# Patient Record
Sex: MALE | Race: WHITE | NOT HISPANIC OR LATINO | Employment: FULL TIME | URBAN - METROPOLITAN AREA
[De-identification: names, ages, dates, MRNs, and addresses within clinical notes are randomized per-mention and may not be internally consistent; named-entity substitution may affect disease eponyms.]

---

## 2022-11-04 ENCOUNTER — HOSPITAL ENCOUNTER (EMERGENCY)
Facility: HOSPITAL | Age: 42
Discharge: HOME | End: 2022-11-04
Attending: EMERGENCY MEDICINE

## 2022-11-04 ENCOUNTER — APPOINTMENT (EMERGENCY)
Dept: RADIOLOGY | Facility: HOSPITAL | Age: 42
End: 2022-11-04

## 2022-11-04 VITALS
OXYGEN SATURATION: 99 % | RESPIRATION RATE: 15 BRPM | TEMPERATURE: 98.5 F | WEIGHT: 299.16 LBS | HEART RATE: 112 BPM | SYSTOLIC BLOOD PRESSURE: 115 MMHG | DIASTOLIC BLOOD PRESSURE: 68 MMHG

## 2022-11-04 DIAGNOSIS — R00.0 SINUS TACHYCARDIA: ICD-10-CM

## 2022-11-04 DIAGNOSIS — M54.50 ACUTE MIDLINE LOW BACK PAIN WITHOUT SCIATICA: Primary | ICD-10-CM

## 2022-11-04 LAB
ALBUMIN SERPL BCP-MCNC: 3.9 G/DL (ref 3.5–5)
ALP SERPL-CCNC: 64 U/L (ref 34–104)
ALT SERPL W P-5'-P-CCNC: 39 U/L (ref 7–52)
ANION GAP SERPL CALCULATED.3IONS-SCNC: 8 MMOL/L (ref 4–13)
AST SERPL W P-5'-P-CCNC: 26 U/L (ref 13–39)
BASOPHILS # BLD AUTO: 0.06 THOUSANDS/ÂΜL (ref 0–0.1)
BASOPHILS NFR BLD AUTO: 1 % (ref 0–1)
BILIRUB SERPL-MCNC: 0.81 MG/DL (ref 0.2–1)
BNP SERPL-MCNC: 349 PG/ML (ref 0–100)
BUN SERPL-MCNC: 16 MG/DL (ref 5–25)
CALCIUM SERPL-MCNC: 9 MG/DL (ref 8.4–10.2)
CARDIAC TROPONIN I PNL SERPL HS: 37 NG/L (ref 8–18)
CARDIAC TROPONIN I PNL SERPL HS: 42 NG/L (ref 8–18)
CHLORIDE SERPL-SCNC: 106 MMOL/L (ref 96–108)
CO2 SERPL-SCNC: 25 MMOL/L (ref 21–32)
CREAT SERPL-MCNC: 1.2 MG/DL (ref 0.6–1.3)
EOSINOPHIL # BLD AUTO: 0.07 THOUSAND/ÂΜL (ref 0–0.61)
EOSINOPHIL NFR BLD AUTO: 1 % (ref 0–6)
ERYTHROCYTE [DISTWIDTH] IN BLOOD BY AUTOMATED COUNT: 14.1 % (ref 11.6–15.1)
GFR SERPL CREATININE-BSD FRML MDRD: 73 ML/MIN/1.73SQ M
GLUCOSE SERPL-MCNC: 103 MG/DL (ref 65–140)
HCT VFR BLD AUTO: 47.1 % (ref 36.5–49.3)
HGB BLD-MCNC: 15.4 G/DL (ref 12–17)
IMM GRANULOCYTES # BLD AUTO: 0.02 THOUSAND/UL (ref 0–0.2)
IMM GRANULOCYTES NFR BLD AUTO: 0 % (ref 0–2)
LYMPHOCYTES # BLD AUTO: 0.71 THOUSANDS/ÂΜL (ref 0.6–4.47)
LYMPHOCYTES NFR BLD AUTO: 11 % (ref 14–44)
MCH RBC QN AUTO: 27.7 PG (ref 26.8–34.3)
MCHC RBC AUTO-ENTMCNC: 32.7 G/DL (ref 31.4–37.4)
MCV RBC AUTO: 85 FL (ref 82–98)
MONOCYTES # BLD AUTO: 0.63 THOUSAND/ÂΜL (ref 0.17–1.22)
MONOCYTES NFR BLD AUTO: 10 % (ref 4–12)
NEUTROPHILS # BLD AUTO: 4.96 THOUSANDS/ÂΜL (ref 1.85–7.62)
NEUTS SEG NFR BLD AUTO: 77 % (ref 43–75)
NRBC BLD AUTO-RTO: 0 /100 WBCS
PLATELET # BLD AUTO: 234 THOUSANDS/UL (ref 149–390)
PMV BLD AUTO: 10.4 FL (ref 8.9–12.7)
POTASSIUM SERPL-SCNC: 4 MMOL/L (ref 3.5–5.3)
PROT SERPL-MCNC: 7.1 G/DL (ref 6.4–8.4)
RBC # BLD AUTO: 5.55 MILLION/UL (ref 3.88–5.62)
SODIUM SERPL-SCNC: 139 MMOL/L (ref 135–147)
TSH SERPL DL<=0.05 MIU/L-ACNC: 1.13 UIU/ML (ref 0.45–4.5)
WBC # BLD AUTO: 6.45 THOUSAND/UL (ref 4.31–10.16)

## 2022-11-04 RX ORDER — CYCLOBENZAPRINE HCL 10 MG
5 TABLET ORAL
Qty: 7 TABLET | Refills: 0 | Status: SHIPPED | OUTPATIENT
Start: 2022-11-04 | End: 2022-11-11

## 2022-11-04 RX ORDER — OXYCODONE HYDROCHLORIDE 5 MG/1
5 CAPSULE ORAL EVERY 6 HOURS PRN
Qty: 10 CAPSULE | Refills: 0 | Status: SHIPPED | OUTPATIENT
Start: 2022-11-04

## 2022-11-04 RX ORDER — OXYCODONE HYDROCHLORIDE 5 MG/1
5 TABLET ORAL ONCE
Status: COMPLETED | OUTPATIENT
Start: 2022-11-04 | End: 2022-11-04

## 2022-11-04 RX ORDER — CYCLOBENZAPRINE HCL 10 MG
5 TABLET ORAL ONCE
Status: COMPLETED | OUTPATIENT
Start: 2022-11-04 | End: 2022-11-04

## 2022-11-04 RX ADMIN — CYCLOBENZAPRINE HYDROCHLORIDE 5 MG: 10 TABLET, FILM COATED ORAL at 16:40

## 2022-11-04 RX ADMIN — OXYCODONE HYDROCHLORIDE 5 MG: 5 TABLET ORAL at 16:40

## 2022-11-04 NOTE — ED PROCEDURE NOTE
PROCEDURE  ECG 12 Lead Documentation Only    Date/Time: 11/4/2022 5:14 PM  Performed by: Maged Blanca MD  Authorized by: Maged Blanca MD     Indications / Diagnosis:  Tachy--   ECG reviewed by me, the ED Provider: yes    Patient location:  ED and bedside  Previous ECG:     Previous ECG:  Unavailable    Comparison to cardiac monitor: Yes    Interpretation:     Interpretation: non-specific    Rate:     ECG rate:  110    ECG rate assessment: tachycardic    Rhythm:     Rhythm: sinus tachycardia    Ectopy:     Ectopy: none    QRS:     QRS axis:  Normal    QRS intervals:   Wide  Conduction:     Conduction: abnormal      Abnormal conduction: non-specific intraventricular conduction delay    ST segments:     ST segments:  Normal  T waves:     T waves: flattening      Flattening:  I, aVL, II, III, aVF, V1 and V6  Q waves:     Q waves:  V1  Comments:      No ecg signs of ischemia/ injury / r heart strain / jazmin/pericarditis          Maged Blanca MD  11/04/22 0075

## 2022-11-04 NOTE — Clinical Note
Zachary Garcia was seen and treated in our emergency department on 11/4/2022  Diagnosis:     Nessa Roberson  may return to work on return date  He may return on this date: 11/09/2022         If you have any questions or concerns, please don't hesitate to call        Ishan Rodriguez MD    ______________________________           _______________          _______________  Hospital Representative                              Date                                Time

## 2022-11-06 LAB
ATRIAL RATE: 110 BPM
P AXIS: 78 DEGREES
PR INTERVAL: 166 MS
QRS AXIS: 71 DEGREES
QRSD INTERVAL: 130 MS
QT INTERVAL: 360 MS
QTC INTERVAL: 487 MS
T WAVE AXIS: 40 DEGREES
VENTRICULAR RATE: 110 BPM

## 2022-11-07 NOTE — ED PROVIDER NOTES
History  Chief Complaint   Patient presents with   • Back Pain     Pt presents to the ED with severe lower back pain onset around 1300  Pt reports getting dressed, putting on boxers when he felt sudden severe pain over his lower back, lowered himself to the ground  Unable to get up from the ground until EMS arrived  Reports hx of sciatica in 2012, otherwise denies reports of imaging or back hx  Denies dysuria  36 yr male wtih acute onset of mid low back pain after bending over to put leg in pants- non radiating-  Worse upon any movement-- no prior back problems-- pt also relates unrelated to back pain -  for approx several months of sob- mote at night and when awkening- feesl has sleep apnea- no cp at anytime- no aponte- no ble edema  Pnd  orthopnea      History provided by:  Patient and spouse   used: No        None       History reviewed  No pertinent past medical history  History reviewed  No pertinent surgical history  History reviewed  No pertinent family history  I have reviewed and agree with the history as documented  E-Cigarette/Vaping     E-Cigarette/Vaping Substances     Social History     Tobacco Use   • Smoking status: Former Smoker   • Smokeless tobacco: Never Used   Substance Use Topics   • Alcohol use: Not Currently   • Drug use: Never       Review of Systems   Constitutional: Negative  HENT: Negative  Eyes: Negative  Respiratory: Positive for shortness of breath  Negative for apnea, cough, choking, chest tightness, wheezing and stridor  Cardiovascular: Negative  Gastrointestinal: Negative  Endocrine: Negative  Genitourinary: Negative  Musculoskeletal: Positive for back pain  Negative for arthralgias, gait problem, joint swelling, myalgias, neck pain and neck stiffness  Skin: Negative  Allergic/Immunologic: Negative  Neurological: Negative  Hematological: Negative  Psychiatric/Behavioral: Negative          Physical Exam  Physical Exam  Vitals and nursing note reviewed  Constitutional:       General: He is in acute distress  Appearance: Normal appearance  He is not ill-appearing, toxic-appearing or diaphoretic  Comments: avss- mild tachcardia--  Pulse ox 99 % on ra- interpretation is normal- no intervention - in low back pain    HENT:      Head: Normocephalic and atraumatic  Nose: Nose normal  No congestion or rhinorrhea  Mouth/Throat:      Mouth: Mucous membranes are moist       Pharynx: Oropharynx is clear  No oropharyngeal exudate or posterior oropharyngeal erythema  Eyes:      General: No scleral icterus  Right eye: No discharge  Left eye: No discharge  Extraocular Movements: Extraocular movements intact  Conjunctiva/sclera: Conjunctivae normal       Pupils: Pupils are equal, round, and reactive to light  Comments: Mm pink   Neck:      Vascular: No carotid bruit  Comments: No pmt c spine   Cardiovascular:      Rate and Rhythm: Regular rhythm  Tachycardia present  Pulses: Normal pulses  Heart sounds: Normal heart sounds  No murmur heard  No friction rub  No gallop  Pulmonary:      Effort: Pulmonary effort is normal  No respiratory distress  Breath sounds: Normal breath sounds  No stridor  No wheezing, rhonchi or rales  Chest:      Chest wall: No tenderness  Abdominal:      General: Bowel sounds are normal  There is no distension  Palpations: Abdomen is soft  There is no mass  Tenderness: There is no abdominal tenderness  There is no right CVA tenderness, left CVA tenderness, guarding or rebound  Hernia: No hernia is present  Comments: Soft nt/nd- no hsm- no cva tenderness- no ascites- no peritoneal signs   Musculoskeletal:         General: Tenderness and signs of injury present  No swelling or deformity  Cervical back: Normal range of motion and neck supple  No rigidity or tenderness  Right lower leg: No edema        Left lower leg: No edema  Comments: Equal bilateral radial/dp pulses- no ble edema/calf tenderness/asym/ erythema- pos pmt lower l spine and medial bilateral paralumbar muscle tenderness to palpation and active rom    Lymphadenopathy:      Cervical: No cervical adenopathy  Skin:     General: Skin is warm  Capillary Refill: Capillary refill takes less than 2 seconds  Coloration: Skin is not jaundiced or pale  Findings: No bruising, erythema, lesion or rash  Neurological:      General: No focal deficit present  Mental Status: He is alert and oriented to person, place, and time  Mental status is at baseline  Cranial Nerves: No cranial nerve deficit  Sensory: No sensory deficit  Motor: No weakness  Coordination: Coordination normal       Gait: Gait normal       Deep Tendon Reflexes: Reflexes normal       Comments: Normal non focal neuro exam    Psychiatric:         Mood and Affect: Mood normal          Behavior: Behavior normal          Thought Content:  Thought content normal          Judgment: Judgment normal          Vital Signs  ED Triage Vitals [11/04/22 1602]   Temperature Pulse Respirations Blood Pressure SpO2   98 5 °F (36 9 °C) (!) 110 16 107/77 99 %      Temp Source Heart Rate Source Patient Position - Orthostatic VS BP Location FiO2 (%)   Oral Monitor Sitting Right arm --      Pain Score       10 - Worst Possible Pain           Vitals:    11/04/22 1602 11/04/22 1842 11/04/22 2030   BP: 107/77 115/68 115/68   Pulse: (!) 110 104 (!) 112   Patient Position - Orthostatic VS: Sitting Sitting          Visual Acuity      ED Medications  Medications   oxyCODONE (ROXICODONE) IR tablet 5 mg (5 mg Oral Given 11/4/22 1640)   cyclobenzaprine (FLEXERIL) tablet 5 mg (5 mg Oral Given 11/4/22 1640)       Diagnostic Studies  Results Reviewed     Procedure Component Value Units Date/Time    High Sensitivity Troponin I Random [[id number]]  (Abnormal) Collected: 11/04/22 2005    Lab Status: Final result Specimen: Blood from Arm, Right Updated: 11/04/22 2056     HS TnI random 37 ng/L     B-Type Natriuretic Peptide(BNP), AN, CA, EA Campuses Only [[id number]]  (Abnormal) Collected: 11/04/22 1907    Lab Status: Final result Specimen: Blood Updated: 11/04/22 1937      pg/mL     TSH [[id number]]  (Normal) Collected: 11/04/22 1840    Lab Status: Final result Specimen: Blood from Arm, Right Updated: 11/04/22 1928     TSH 3RD GENERATON 1 129 uIU/mL     Narrative:      Patients undergoing fluorescein dye angiography may retain small amounts of fluorescein in the body for 48-72 hours post procedure  Samples containing fluorescein can produce falsely depressed TSH values  If the patient had this procedure,a specimen should be resubmitted post fluorescein clearance        High Sensitivity Troponin I Random [[id number]]  (Abnormal) Collected: 11/04/22 1840    Lab Status: Final result Specimen: Blood from Arm, Right Updated: 11/04/22 1919     HS TnI random 42 ng/L     Comprehensive metabolic panel [[id number]] Collected: 11/04/22 1840    Lab Status: Final result Specimen: Blood from Arm, Right Updated: 11/04/22 1910     Sodium 139 mmol/L      Potassium 4 0 mmol/L      Chloride 106 mmol/L      CO2 25 mmol/L      ANION GAP 8 mmol/L      BUN 16 mg/dL      Creatinine 1 20 mg/dL      Glucose 103 mg/dL      Calcium 9 0 mg/dL      AST 26 U/L      ALT 39 U/L      Alkaline Phosphatase 64 U/L      Total Protein 7 1 g/dL      Albumin 3 9 g/dL      Total Bilirubin 0 81 mg/dL      eGFR 73 ml/min/1 73sq m     Narrative:      [facility] guidelines for Chronic Kidney Disease (CKD):   •  Stage 1 with normal or high GFR (GFR > 90 mL/min/1 73 square meters)  •  Stage 2 Mild CKD (GFR = 60-89 mL/min/1 73 square meters)  •  Stage 3A Moderate CKD (GFR = 45-59 mL/min/1 73 square meters)  •  Stage 3B Moderate CKD (GFR = 30-44 mL/min/1 73 square meters)  •  Stage 4 Severe CKD (GFR = 15-29 mL/min/1 73 square meters)  •  Stage 5 End Stage CKD (GFR <15 mL/min/1 73 square meters)  Note: GFR calculation is accurate only with a steady state creatinine    CBC and differential [[id number]]  (Abnormal) Collected: 11/04/22 1840    Lab Status: Final result Specimen: Blood from Arm, Right Updated: 11/04/22 1850     WBC 6 45 Thousand/uL      RBC 5 55 Million/uL      Hemoglobin 15 4 g/dL      Hematocrit 47 1 %      MCV 85 fL      MCH 27 7 pg      MCHC 32 7 g/dL      RDW 14 1 %      MPV 10 4 fL      Platelets 171 Thousands/uL      nRBC 0 /100 WBCs      Neutrophils Relative 77 %      Immat GRANS % 0 %      Lymphocytes Relative 11 %      Monocytes Relative 10 %      Eosinophils Relative 1 %      Basophils Relative 1 %      Neutrophils Absolute 4 96 Thousands/µL      Immature Grans Absolute 0 02 Thousand/uL      Lymphocytes Absolute 0 71 Thousands/µL      Monocytes Absolute 0 63 Thousand/µL      Eosinophils Absolute 0 07 Thousand/µL      Basophils Absolute 0 06 Thousands/µL                  XR chest 1 view portable   Final Result by Mica Freedman MD (11/05 0720)      No acute cardiopulmonary disease                    Workstation performed: [id number]                    Procedures  Procedures         ED Course  ED Course as of 11/07/22 1629   Fri Nov 04, 2022   1656 -- er md note-  6/22 outpt labs al l reviewed by er md   5 Er md note-   pt previous  b well [place]- which is a health center at his employer-= [facility] -- pulse has been persistently elevated  -- bp normal -= they do describe a 12 lead ecg which shows sinus tach with t wave flattening-- and rec that pt see cardiology-- pt has not seen cardiology-- pt describes sob-- but more of at night and in am - pt feels he has sleep apnea-- pt denies any progressive aponte/ cp at anytime-- no ble edema/ orthopnea/ pnd--  pt is agreeable to more of a er workup-- which is unrelated to reason that he is her for low back pain upon bending over to put his leg in his pants-    1748 Cxr portable-- borderline cardiomegaly -- normal mediastinum/no free/sq air- no infiltrate/ ptx/ pulm edema/ pleural effusions   2138 - er md note- pt re-evaluated- juan alberto to ambulate with er md prior to er d/c- with decreased pain                                              MDM    Disposition  Final diagnoses:   Acute midline low back pain without sciatica   Sinus tachycardia     Time reflects when diagnosis was documented in both MDM as applicable and the Disposition within this note     Time User Action Codes Description Comment    11/4/2022  9:45 PM Bonner Banister Add [M54 50] Acute midline low back pain without sciatica     11/4/2022  9:45 PM Bonner Banister Add [R00 0] Sinus tachycardia       ED Disposition     ED Disposition   Discharge    Condition   Stable    Date/Time   Fri Nov 4, 2022  9:45 PM    Comment   [place] discharge to home/self care                 Follow-up Information     Follow up With Specialties Details Why Contact Info Additional Information    Barb Martinez DO Family Medicine Call in 2 days  [address] [address]  [phone]       [address] [place] [address] Pain Medicine Call  [facility] [address] [place] [phone]  [facility], [address] [facility], [address] [place] [address], [facility]          Discharge Medication List as of 11/4/2022  9:58 PM      START taking these medications    Details   cyclobenzaprine (FLEXERIL) 10 mg tablet Take 0 5 tablets (5 mg total) by mouth daily at bedtime for 7 doses, Starting Fri 11/4/2022, Until Fri 11/11/2022, Normal      oxyCODONE (OXY-IR) 5 MG capsule Take 1 capsule (5 mg total) by mouth every 6 (six) hours as needed for severe pain for up to 10 doses CAN SUBSTITUTE OXYCODONE TABLETS FOR CAPSULES AS NEEDED FOR SEVERE PAIN Max Daily Amount: 20 mg, Starting Fri 11/4/2022, Normal             Outpatient Discharge Orders Ambulatory Referral to Pain Management   Standing Status: Future Standing Exp  Date: 12/04/22      Echo complete w/ contrast if indicated   Standing Status: Future Standing Exp   Date: 12/04/22       PDMP Review     None          ED Provider  Electronically Signed by           Kenny Dutton MD  11/07/22 9395

## 2022-11-17 ENCOUNTER — HOSPITAL ENCOUNTER (OUTPATIENT)
Dept: NON INVASIVE DIAGNOSTICS | Facility: HOSPITAL | Age: 42
Discharge: HOME | End: 2022-11-17
Attending: EMERGENCY MEDICINE

## 2022-11-17 VITALS
SYSTOLIC BLOOD PRESSURE: 144 MMHG | DIASTOLIC BLOOD PRESSURE: 109 MMHG | HEIGHT: 74 IN | BODY MASS INDEX: 38.37 KG/M2 | HEART RATE: 120 BPM | WEIGHT: 299 LBS

## 2022-11-17 DIAGNOSIS — R00.0 SINUS TACHYCARDIA: ICD-10-CM

## 2022-11-17 LAB
AORTIC ROOT: 3.5 CM
FRACTIONAL SHORTENING: 8 % (ref 28–44)
INTERVENTRICULAR SEPTUM IN DIASTOLE (PARASTERNAL SHORT AXIS VIEW): 1.2 CM
INTERVENTRICULAR SEPTUM: 1.2 CM (ref 0.6–1.1)
LAAS-AP4: 27.9 CM2
LEFT ATRIUM SIZE: 5.6 CM
LEFT INTERNAL DIMENSION IN SYSTOLE: 6.7 CM (ref 2.1–4)
LEFT VENTRICULAR INTERNAL DIMENSION IN DIASTOLE: 7.3 CM (ref 3.5–6)
LEFT VENTRICULAR POSTERIOR WALL IN END DIASTOLE: 1.2 CM
LEFT VENTRICULAR STROKE VOLUME: 55 ML
LVSV (TEICH): 55 ML
RIGHT ATRIAL 2D VOLUME: 91 ML
RIGHT ATRIUM AREA SYSTOLE A4C: 26.5 CM2
RIGHT VENTRICLE ID DIMENSION: 4.5 CM
SL CV LV EF: 20
SL CV PED ECHO LEFT VENTRICLE DIASTOLIC VOLUME (MOD BIPLANE) 2D: 283 ML
SL CV PED ECHO LEFT VENTRICLE SYSTOLIC VOLUME (MOD BIPLANE) 2D: 228 ML

## 2022-11-21 ENCOUNTER — OFFICE VISIT (OUTPATIENT)
Dept: CARDIOLOGY CLINIC | Facility: CLINIC | Age: 42
End: 2022-11-21

## 2022-11-21 ENCOUNTER — PREP FOR PROCEDURE (OUTPATIENT)
Dept: CARDIOLOGY CLINIC | Facility: CLINIC | Age: 42
End: 2022-11-21

## 2022-11-21 VITALS
HEART RATE: 113 BPM | SYSTOLIC BLOOD PRESSURE: 138 MMHG | DIASTOLIC BLOOD PRESSURE: 82 MMHG | WEIGHT: 284 LBS | OXYGEN SATURATION: 99 % | HEIGHT: 74 IN | BODY MASS INDEX: 36.45 KG/M2

## 2022-11-21 DIAGNOSIS — R29.818 SUSPECTED SLEEP APNEA: ICD-10-CM

## 2022-11-21 DIAGNOSIS — I50.20 HEART FAILURE WITH REDUCED EJECTION FRACTION (HCC): Primary | ICD-10-CM

## 2022-11-21 DIAGNOSIS — R03.0 ELEVATED BP WITHOUT DIAGNOSIS OF HYPERTENSION: ICD-10-CM

## 2022-11-21 DIAGNOSIS — E78.2 MIXED HYPERLIPIDEMIA: ICD-10-CM

## 2022-11-21 RX ORDER — FUROSEMIDE 20 MG/1
20 TABLET ORAL DAILY
Qty: 30 TABLET | Refills: 1 | Status: SHIPPED | OUTPATIENT
Start: 2022-11-21 | End: 2022-11-30

## 2022-11-21 RX ORDER — METOPROLOL SUCCINATE 25 MG/1
12.5 TABLET, EXTENDED RELEASE ORAL 2 TIMES DAILY
Qty: 45 TABLET | Refills: 1 | Status: SHIPPED | OUTPATIENT
Start: 2022-11-21 | End: 2022-11-30 | Stop reason: SDUPTHER

## 2022-11-21 RX ORDER — SACUBITRIL AND VALSARTAN 24; 26 MG/1; MG/1
1 TABLET, FILM COATED ORAL 2 TIMES DAILY
Qty: 60 TABLET | Refills: 1 | Status: SHIPPED | OUTPATIENT
Start: 2022-11-21

## 2022-11-21 NOTE — PROGRESS NOTES
Advanced Heart Failure Outpatient Consult Note Sudha Brock 36 y o  male MRN: [id number]    Encounter: [id number]      Assessment/Plan:    Patient Active Problem List    Diagnosis Date Noted   • Heart failure with reduced ejection fraction ([address] ) 11/22/2022   • Mixed hyperlipidemia 11/22/2022   • Suspected sleep apnea 11/22/2022   • Elevated BP without diagnosis of hypertension 11/22/2022       Newly diagnosed eart failure reduced ejection fraction, Stage C, NYHA II  Etiology: unclear  Suspect chronic given dilated LV  Unclear etiology  Reports father had a heart attack/sudden death at 63 y/o  Other risk factors for CAD including previous smoking, dyslipidemia and obesity  No history of heavy alcohol or drug use  No supplements or steroid use  Tachycardic but sinus rhythm with no conduction abnormality on EKG  TSH normal    Volume up, warm on exam    Weight: 284 lbs  BNP: 349 11/4/22    Studies- personally reviewed by me    Serology: TSH normal  Echocardiogram 11/17/22  LVEF: 15%  LVIDd: 7 3cm  RV: mildly dilated, mildly reduced systolic function  MR: trace  PASP: inadequate TR envelope for estimation  RVOT: no notching  Other: mildly increased wall thickness    EKG: sinus tachycardia, IVCD - QRSd 130 msec, T wave abnormality in the anterolateral leads    Diet:  2 g sodium diet  2000 mL fluid restriction    Neurohormonal Blockade:  --Beta-Blocker:  --ACEi, ARB or ARNi:  --Aldosterone Receptor Blocker:  --SGLT2 Inhibitor:  --Diuretic:    Sudden Cardiac Death Risk Reduction:  --ICD: LifeVest candidate    Electrical Resynchronization:  --Candidacy for BiV device: IVCD QRSd 130 msec    Advanced Therapies (if appropriate):   --We will continue to monitor    Mixed hyperlipidemia  6/20/22:  HDL 44   ASCVD risk: 3 2%  Sinus tachycardia, likely maladaptive response  Suspected sleep apnea  Elevated BP    Today's Plan:  Start entresto 24-26mg two times a day  Start metoprolol succinate 12 5mg two times a day  Start furosemide 20mg once a a day  Obtain blood tests as ordered next week  Obtain ZIO  We will order LifeVest  Cardiac cath to assess for CAD given risk factors  Cardiac MRI  2g sodium diet  2L fluid restriction  Daily weights      HPI:   36year old male with no known significant PMH who was recently diagnosed with cardiomyopathy  He pulled a muscle at work and had pinged nerve  He had some other testing while being evaluated and found to have an abnormal EKG  He was also noticing shortness of breath laying down and PND at that time  He subsequently had an echocardiogram which showed dilated LV at 7 3cm and severely reduced ejection fraction at 15%  Last wellness check showed mild tachycardia in 2021  Otherwise no other cardiac or medical history  Blood pressures were normal  He used to work out doing 20 mins of cardio and 20 mins of lifting until he got busy  Recently noticing shortness of breath when running up the steps  Continues to have orthopnea and PND  No leg swelling  Some abdominal fullness  FH: father  at of "heart attack" at 64; unclear if with CAD; no other known heart disease or sudden death in family  SH: quit smoking 6 years ago, smoked 1/2 ppd x 17 years; no alcohol use; used marijuana during teenage years  No flu-like or viral syndrome  Got covid vaccines 2021 and 2021    History reviewed  No pertinent past medical history  Review of Systems   Constitutional: Negative for chills and fever  HENT: Negative for ear pain and sore throat  Eyes: Negative for pain and visual disturbance  Respiratory: Positive for shortness of breath  Negative for cough  Cardiovascular: Negative for chest pain, palpitations and leg swelling  Gastrointestinal: Negative for abdominal pain, nausea and vomiting  Genitourinary: Negative for dysuria and hematuria  Musculoskeletal: Negative for arthralgias and back pain  Skin: Negative for color change and rash     Neurological: Negative for dizziness, seizures and syncope  All other systems reviewed and are negative  No Known Allergies    Current Outpatient Medications:   •  furosemide (LASIX) 20 mg tablet, Take 1 tablet (20 mg total) by mouth daily, Disp: 30 tablet, Rfl: 1  •  metoprolol succinate (TOPROL-XL) 25 mg 24 hr tablet, Take 0 5 tablets (12 5 mg total) by mouth 2 (two) times a day, Disp: 45 tablet, Rfl: 1  •  sacubitril-valsartan (Entresto) 24-26 MG TABS, Take 1 tablet by mouth 2 (two) times a day, Disp: 60 tablet, Rfl: 1  •  cyclobenzaprine (FLEXERIL) 10 mg tablet, Take 0 5 tablets (5 mg total) by mouth daily at bedtime for 7 doses, Disp: 7 tablet, Rfl: 0  •  oxyCODONE (OXY-IR) 5 MG capsule, Take 1 capsule (5 mg total) by mouth every 6 (six) hours as needed for severe pain for up to 10 doses CAN SUBSTITUTE OXYCODONE TABLETS FOR CAPSULES AS NEEDED FOR SEVERE PAIN Max Daily Amount: 20 mg, Disp: 10 capsule, Rfl: 0    Social History     Socioeconomic History   • Marital status: /Civil Union     Spouse name: Not on file   • Number of children: Not on file   • Years of education: Not on file   • Highest education level: Not on file   Occupational History   • Not on file   Tobacco Use   • Smoking status: Former   • Smokeless tobacco: Never   Substance and Sexual Activity   • Alcohol use: Not Currently   • Drug use: Never   • Sexual activity: Not on file   Other Topics Concern   • Not on file   Social History Narrative   • Not on file     Social Determinants of Health     Financial Resource Strain: Not on file   Food Insecurity: Not on file   Transportation Needs: Not on file   Physical Activity: Not on file   Stress: Not on file   Social Connections: Not on file   Intimate Partner Violence: Not on file   Housing Stability: Not on file       History reviewed  No pertinent family history  Physical Exam:    Vitals: Blood pressure 138/82, pulse (!) 113, height 6' 2" (1 88 m), weight 129 kg (284 lb), SpO2 99 %  , Body mass index is 36 46 kg/m² ,   Wt Readings from Last 3 Encounters:   11/21/22 129 kg (284 lb)   11/17/22 136 kg (299 lb)   11/04/22 136 kg (299 lb 2 6 oz)       Physical Exam  Constitutional:       General: He is not in acute distress  Appearance: Normal appearance  HENT:      Head: Normocephalic and atraumatic  Mouth/Throat:      Mouth: Mucous membranes are moist    Eyes:      General: No scleral icterus  Extraocular Movements: Extraocular movements intact  Cardiovascular:      Rate and Rhythm: Normal rate and regular rhythm  Pulses: Normal pulses  Heart sounds: S1 normal and S2 normal  No murmur heard  No friction rub  No gallop  Comments: Elevated JVP  Pulmonary:      Breath sounds: Normal breath sounds  Abdominal:      General: There is no distension  Palpations: Abdomen is soft  Tenderness: There is no abdominal tenderness  There is no guarding or rebound  Musculoskeletal:         General: Normal range of motion  Cervical back: Neck supple  Right lower leg: No edema  Left lower leg: No edema  Skin:     General: Skin is warm and dry  Capillary Refill: Capillary refill takes less than 2 seconds  Neurological:      General: No focal deficit present  Mental Status: He is alert and oriented to person, place, and time     Psychiatric:         Mood and Affect: Mood normal          Labs & Results:    Lab Results   Component Value Date    WBC 6 45 11/04/2022    HGB 15 4 11/04/2022    HCT 47 1 11/04/2022    MCV 85 11/04/2022     11/04/2022     Lab Results   Component Value Date    SODIUM 139 11/04/2022    K 4 0 11/04/2022     11/04/2022    CO2 25 11/04/2022    BUN 16 11/04/2022    CREATININE 1 20 11/04/2022    GLUC 103 11/04/2022    CALCIUM 9 0 11/04/2022     No results found for: NTBNP   No results found for: CHOLESTEROL  No results found for: HDL  No results found for: TRIG  No results found for: Denae    EKG personally reviewed by Lizz Orantes MD      Counseling / Coordination of Care  Time spent today 40 minutes  Greater than 50% of total time was spent with the patient and / or family counseling and / or coordination of care  We discussed diagnoses, most recent studies and any changes in treatment    Thank you for the opportunity to participate in the care of this patient      Yanick Cason MD  ADVANCED HEART FAILURE AND MECHANICAL CIRCULATORY SUPPORT  [facility]

## 2022-11-21 NOTE — PATIENT INSTRUCTIONS
Start entresto 24-26mg two times a day  Start metoprolol succinate 12 5mg two times a day  Start furosemide 20mg once a a day  Obtain blood tests as ordered next week  Obtain heart monitor  We will order LifeVest  Cardiac cath, cardiac MRI

## 2022-11-22 ENCOUNTER — TELEPHONE (OUTPATIENT)
Dept: CARDIOLOGY CLINIC | Facility: CLINIC | Age: 42
End: 2022-11-22

## 2022-11-22 DIAGNOSIS — I50.20 HEART FAILURE WITH REDUCED EJECTION FRACTION (HCC): Primary | ICD-10-CM

## 2022-11-22 PROBLEM — E78.2 MIXED HYPERLIPIDEMIA: Status: ACTIVE | Noted: 2022-11-22

## 2022-11-22 PROBLEM — R29.818 SUSPECTED SLEEP APNEA: Status: ACTIVE | Noted: 2022-11-22

## 2022-11-22 PROBLEM — R03.0 ELEVATED BP WITHOUT DIAGNOSIS OF HYPERTENSION: Status: ACTIVE | Noted: 2022-11-22

## 2022-11-22 NOTE — TELEPHONE ENCOUNTER
Patient scheduled for Left Heart CATH on 12/6/22 at [facility] with Dr Miladis Berrios  Instructions sent to patient through [address]  Patient aware of all general instructions  Medication holds:   Furosemide - Hold morning of procedure  Labs to be done by 11/29/22:  CMP / CBC       Insurance: BLUE CROSS     Please obtain auth       Thank you,  Christi Zendejas

## 2022-11-22 NOTE — TELEPHONE ENCOUNTER
[address])   enabled  • Tuesday, Nov 22  • Hi Dr Dolan, can you clarify if patient Anna Payne MRN: [id number] is for [facility] or [facility]?  Thank you, Rhys Núñez   2:19 PM  20 days left  Read  • [facility]

## 2022-11-22 NOTE — PROGRESS NOTES
Advanced Heart Failure Outpatient Progress Note - Alejandra Goncalves 36 y o  male MRN: [id number]    Encounter: [id number]      Assessment/Plan:    Patient Active Problem List    Diagnosis Date Noted   • Heart failure with reduced ejection fraction ([address] ) 11/22/2022   • Mixed hyperlipidemia 11/22/2022   • Suspected sleep apnea 11/22/2022   • Elevated BP without diagnosis of hypertension 11/22/2022       # Newly diagnosed eart failure reduced ejection fraction, Stage C, NYHA II  Etiology: unclear  Suspect chronic given dilated LV  Unclear etiology  Reports father had a heart attack/sudden death at 65 y/o  Other risk factors for CAD including previous smoking, dyslipidemia and obesity  No history of heavy alcohol or drug use  No supplements or steroid use  Tachycardic but sinus rhythm with no conduction abnormality on EKG  TSH normal    Volume up, warm on exam    Weight: 284 lbs 11/21/22; 277 lbs 11/30/22  BNP: 349 11/4/22    Studies- personally reviewed by me    Serology: TSH normal, iron panel normal    Echocardiogram 11/17/22  LVEF: 15%  LVIDd: 7 3cm  RV: mildly dilated, mildly reduced systolic function  MR: trace  PASP: inadequate TR envelope for estimation  RVOT: no notching  Other: mildly increased wall thickness    EKG: sinus tachycardia, IVCD - QRSd 130 msec, T wave abnormality in the anterolateral leads    Diet:  2 g sodium diet  2000 mL fluid restriction    Neurohormonal Blockade:  --Beta-Blocker: metoprolol succinate 12 5mg BID  --ACEi, ARB or ARNi: entresto 24-26mg BID  --Aldosterone Receptor Blocker:   --SGLT2 Inhibitor:  --Diuretic: furosemide 20mg daily    Sudden Cardiac Death Risk Reduction:  --ICD: LifeVest candidate    Electrical Resynchronization:  --Candidacy for BiV device: IVCD QRSd 130 msec    Advanced Therapies (if appropriate):   --We will continue to monitor    # Mixed hyperlipidemia  6/20/22:  HDL 44   ASCVD risk: 3 2%  # Sinus tachycardia, likely maladaptive response  # Suspected sleep apnea  # Hypertension, improved    Today's Plan:  Increase metoprolol succinate 25mg two times a day  Decrease furosemide to 20mg daily as needed for weight gain of 3 lbs in a day or 5 lbs in 5-7 days  Cardiac cath as scheduled  GDMT optimization  2g sodium diet  2L fluid restriction  Daily weights      HPI:   36year old male with no known significant PMH who was recently diagnosed with cardiomyopathy  He pulled a muscle at work and had pinged nerve  He had some other testing while being evaluated and found to have an abnormal EKG  He was also noticing shortness of breath laying down and PND at that time  He subsequently had an echocardiogram which showed dilated LV at 7 3cm and severely reduced ejection fraction at 15%  Last wellness check showed mild tachycardia in 2021  Otherwise no other cardiac or medical history  Blood pressures were normal  He used to work out doing 20 mins of cardio and 20 mins of lifting until he got busy  Recently noticing shortness of breath when running up the steps  Continues to have orthopnea and PND  No leg swelling  Some abdominal fullness  FH: father  at of "heart attack" at 64; unclear if with CAD; no other known heart disease or sudden death in family  SH: quit smoking 6 years ago, smoked 1/2 ppd x 17 years; no alcohol use; used marijuana during teenage years  No flu-like or viral syndrome  Got covid vaccines 2021 and 2021    Interval History:  22: Here for follow up  On last visit: Started entresto 24-26mg, metoprolol and furosemide  22 Na 139 K 4 1 creatinine 1 4  He is overall feeling better  He is able to sleep better, no orthopnea or leg swelling  Weight down 7 lbs  Tolerating medications  Scheduled for cardiac cath, cardiac MRI and sleep study  History reviewed  No pertinent past medical history  Review of Systems   Constitutional: Negative for chills, fatigue and fever  HENT: Negative for ear pain and sore throat      Eyes: Negative for pain and visual disturbance  Respiratory: Positive for shortness of breath  Negative for cough  Cardiovascular: Negative for chest pain, palpitations and leg swelling  Gastrointestinal: Negative for abdominal distention, abdominal pain, nausea and vomiting  Genitourinary: Negative for dysuria and hematuria  Musculoskeletal: Negative for arthralgias and back pain  Skin: Negative for color change and rash  Neurological: Negative for dizziness, seizures and syncope  All other systems reviewed and are negative  No Known Allergies    Current Outpatient Medications:   •  furosemide (LASIX) 20 mg tablet, As needed for weight gain of 3 lbs in a day or 5 lbs in 5-7 days, Disp: 30 tablet, Rfl: 1  •  metoprolol succinate (TOPROL-XL) 25 mg 24 hr tablet, Take 1 tablet (25 mg total) by mouth 2 (two) times a day, Disp: 180 tablet, Rfl: 1  •  sacubitril-valsartan (Entresto) 24-26 MG TABS, Take 1 tablet by mouth 2 (two) times a day, Disp: 60 tablet, Rfl: 1    Social History     Socioeconomic History   • Marital status: /Civil Union     Spouse name: Not on file   • Number of children: Not on file   • Years of education: Not on file   • Highest education level: Not on file   Occupational History   • Not on file   Tobacco Use   • Smoking status: Former   • Smokeless tobacco: Never   Substance and Sexual Activity   • Alcohol use: Not Currently   • Drug use: Never   • Sexual activity: Not on file   Other Topics Concern   • Not on file   Social History Narrative   • Not on file     Social Determinants of Health     Financial Resource Strain: Not on file   Food Insecurity: Not on file   Transportation Needs: Not on file   Physical Activity: Not on file   Stress: Not on file   Social Connections: Not on file   Intimate Partner Violence: Not on file   Housing Stability: Not on file       History reviewed  No pertinent family history      Physical Exam:    Vitals: Blood pressure 110/88, pulse 100, height 6' 2" (1 88 m), weight 126 kg (277 lb), SpO2 99 %  , Body mass index is 35 56 kg/m² ,   Wt Readings from Last 3 Encounters:   11/30/22 126 kg (277 lb)   11/21/22 129 kg (284 lb)   11/17/22 136 kg (299 lb)       Physical Exam  Constitutional:       General: He is not in acute distress  Appearance: Normal appearance  HENT:      Head: Normocephalic and atraumatic  Mouth/Throat:      Mouth: Mucous membranes are moist    Eyes:      General: No scleral icterus  Extraocular Movements: Extraocular movements intact  Cardiovascular:      Rate and Rhythm: Normal rate and regular rhythm  Pulses: Normal pulses  Heart sounds: S1 normal and S2 normal  No murmur heard  No friction rub  No gallop  Comments: Nonelevated JVP  Pulmonary:      Breath sounds: Normal breath sounds  Abdominal:      General: There is no distension  Palpations: Abdomen is soft  Tenderness: There is no abdominal tenderness  There is no guarding or rebound  Musculoskeletal:         General: Normal range of motion  Cervical back: Neck supple  Right lower leg: No edema  Left lower leg: No edema  Skin:     General: Skin is warm and dry  Capillary Refill: Capillary refill takes less than 2 seconds  Neurological:      General: No focal deficit present  Mental Status: He is alert and oriented to person, place, and time     Psychiatric:         Mood and Affect: Mood normal          Labs & Results:    Lab Results   Component Value Date    WBC 8 08 11/25/2022    HGB 16 9 11/25/2022    HCT 52 5 (H) 11/25/2022    MCV 87 11/25/2022     11/25/2022     Lab Results   Component Value Date    SODIUM 139 11/25/2022    K 4 1 11/25/2022     11/25/2022    CO2 24 11/25/2022    BUN 19 11/25/2022    CREATININE 1 40 (H) 11/25/2022    GLUC 103 11/04/2022    CALCIUM 9 2 11/25/2022     No results found for: NTBNP   No results found for: CHOLESTEROL  No results found for: HDL  No results found for: TRIG  No results found for: [place]    EKG personally reviewed by Claudio Christiansen MD      Counseling / Coordination of Care  Time spent today 25 minutes  Greater than 50% of total time was spent with the patient and / or family counseling and / or coordination of care  We discussed diagnoses, most recent studies and any changes in treatment    Thank you for the opportunity to participate in the care of this patient      Vita Nascimento MD  ADVANCED HEART FAILURE AND MECHANICAL CIRCULATORY SUPPORT  [facility]

## 2022-11-22 NOTE — TELEPHONE ENCOUNTER
I called patient and informed I received referral for heart catheterization and will call him back once I know which exact test Mariposa Johnston wants him to have  Sent tiger text to Mariposa Johnston to clarify if RHC or LHC

## 2022-11-25 ENCOUNTER — APPOINTMENT (OUTPATIENT)
Dept: LAB | Facility: CLINIC | Age: 42
End: 2022-11-25

## 2022-11-25 DIAGNOSIS — I50.20 HEART FAILURE WITH REDUCED EJECTION FRACTION (HCC): ICD-10-CM

## 2022-11-25 LAB
ALBUMIN SERPL BCP-MCNC: 4.2 G/DL (ref 3.5–5)
ALP SERPL-CCNC: 62 U/L (ref 34–104)
ALT SERPL W P-5'-P-CCNC: 44 U/L (ref 7–52)
ANION GAP SERPL CALCULATED.3IONS-SCNC: 8 MMOL/L (ref 4–13)
AST SERPL W P-5'-P-CCNC: 29 U/L (ref 13–39)
BASOPHILS # BLD AUTO: 0.1 THOUSANDS/ÂΜL (ref 0–0.1)
BASOPHILS NFR BLD AUTO: 1 % (ref 0–1)
BILIRUB SERPL-MCNC: 0.91 MG/DL (ref 0.2–1)
BUN SERPL-MCNC: 19 MG/DL (ref 5–25)
CALCIUM SERPL-MCNC: 9.2 MG/DL (ref 8.4–10.2)
CHLORIDE SERPL-SCNC: 107 MMOL/L (ref 96–108)
CO2 SERPL-SCNC: 24 MMOL/L (ref 21–32)
CREAT SERPL-MCNC: 1.4 MG/DL (ref 0.6–1.3)
EOSINOPHIL # BLD AUTO: 0.74 THOUSAND/ÂΜL (ref 0–0.61)
EOSINOPHIL NFR BLD AUTO: 9 % (ref 0–6)
ERYTHROCYTE [DISTWIDTH] IN BLOOD BY AUTOMATED COUNT: 14.5 % (ref 11.6–15.1)
FERRITIN SERPL-MCNC: 147 NG/ML (ref 8–388)
GFR SERPL CREATININE-BSD FRML MDRD: 61 ML/MIN/1.73SQ M
GLUCOSE P FAST SERPL-MCNC: 113 MG/DL (ref 65–99)
HCT VFR BLD AUTO: 52.5 % (ref 36.5–49.3)
HGB BLD-MCNC: 16.9 G/DL (ref 12–17)
IMM GRANULOCYTES # BLD AUTO: 0.03 THOUSAND/UL (ref 0–0.2)
IMM GRANULOCYTES NFR BLD AUTO: 0 % (ref 0–2)
IRON SATN MFR SERPL: 27 % (ref 20–50)
IRON SERPL-MCNC: 96 UG/DL (ref 65–175)
LYMPHOCYTES # BLD AUTO: 1.98 THOUSANDS/ÂΜL (ref 0.6–4.47)
LYMPHOCYTES NFR BLD AUTO: 25 % (ref 14–44)
MCH RBC QN AUTO: 27.8 PG (ref 26.8–34.3)
MCHC RBC AUTO-ENTMCNC: 32.2 G/DL (ref 31.4–37.4)
MCV RBC AUTO: 87 FL (ref 82–98)
MONOCYTES # BLD AUTO: 0.85 THOUSAND/ÂΜL (ref 0.17–1.22)
MONOCYTES NFR BLD AUTO: 11 % (ref 4–12)
NEUTROPHILS # BLD AUTO: 4.38 THOUSANDS/ÂΜL (ref 1.85–7.62)
NEUTS SEG NFR BLD AUTO: 54 % (ref 43–75)
NRBC BLD AUTO-RTO: 0 /100 WBCS
PLATELET # BLD AUTO: 338 THOUSANDS/UL (ref 149–390)
PMV BLD AUTO: 10.9 FL (ref 8.9–12.7)
POTASSIUM SERPL-SCNC: 4.1 MMOL/L (ref 3.5–5.3)
PROT SERPL-MCNC: 7.6 G/DL (ref 6.4–8.4)
RBC # BLD AUTO: 6.07 MILLION/UL (ref 3.88–5.62)
SODIUM SERPL-SCNC: 139 MMOL/L (ref 135–147)
TIBC SERPL-MCNC: 359 UG/DL (ref 250–450)
WBC # BLD AUTO: 8.08 THOUSAND/UL (ref 4.31–10.16)

## 2022-11-28 ENCOUNTER — TELEPHONE (OUTPATIENT)
Dept: SLEEP CENTER | Facility: CLINIC | Age: 42
End: 2022-11-28

## 2022-11-28 NOTE — TELEPHONE ENCOUNTER
----- Message from Jean Claude Crowell MD sent at 11/26/2022  8:00 AM EST -----  approved  ----- Message -----  From: Janie Morrow  Sent: 11/22/2022   9:48 AM EST  To: Sleep Medicine [facility] Provider    This Diagnostic sleep study needs approval      If approved please sign and return to clerical pool  If denied please include reasons why  Also provide alternative testing if warranted  Please sign and return to clerical pool

## 2022-11-28 NOTE — TELEPHONE ENCOUNTER
----- Message from Gill Delarosa MD sent at 11/26/2022  8:00 AM EST -----  approved  ----- Message -----  From: Awais Beyer  Sent: 11/22/2022   9:48 AM EST  To: Sleep Medicine [facility] Provider    This Diagnostic sleep study needs approval      If approved please sign and return to clerical pool  If denied please include reasons why  Also provide alternative testing if warranted  Please sign and return to clerical pool

## 2022-11-28 NOTE — TELEPHONE ENCOUNTER
---- Message -----  From: Lolis Faulkner MA  Sent: 11/22/2022   3:28 PM EST  To: Koby Magana, *    Per [facility] online precert resource list and [place], [id number] or [id number], does not require authorization  Per EPIC RTE registration, this patient has outpatient benefits  This is a valid and billable code

## 2022-11-30 ENCOUNTER — OFFICE VISIT (OUTPATIENT)
Dept: CARDIOLOGY CLINIC | Facility: CLINIC | Age: 42
End: 2022-11-30

## 2022-11-30 VITALS
HEIGHT: 74 IN | SYSTOLIC BLOOD PRESSURE: 110 MMHG | BODY MASS INDEX: 35.55 KG/M2 | OXYGEN SATURATION: 99 % | HEART RATE: 100 BPM | WEIGHT: 277 LBS | DIASTOLIC BLOOD PRESSURE: 88 MMHG

## 2022-11-30 DIAGNOSIS — I10 PRIMARY HYPERTENSION: ICD-10-CM

## 2022-11-30 DIAGNOSIS — E78.2 MIXED HYPERLIPIDEMIA: ICD-10-CM

## 2022-11-30 DIAGNOSIS — I50.20 HEART FAILURE WITH REDUCED EJECTION FRACTION (HCC): Primary | ICD-10-CM

## 2022-11-30 DIAGNOSIS — R29.818 SUSPECTED SLEEP APNEA: ICD-10-CM

## 2022-11-30 RX ORDER — FUROSEMIDE 20 MG/1
TABLET ORAL
Qty: 30 TABLET | Refills: 1
Start: 2022-11-30

## 2022-11-30 RX ORDER — METOPROLOL SUCCINATE 25 MG/1
25 TABLET, EXTENDED RELEASE ORAL 2 TIMES DAILY
Qty: 180 TABLET | Refills: 1 | Status: SHIPPED | OUTPATIENT
Start: 2022-11-30

## 2022-11-30 NOTE — PATIENT INSTRUCTIONS
Increase metoprolol succinate 25mg two times a day  Decrease furosemide to 20mg daily as needed for weight gain of 3 lbs in a day or 5 lbs in 5-7 days  Cardiac cath as scheduled  2g sodium diet  2L fluid restriction  Daily weights

## 2022-12-06 ENCOUNTER — HOSPITAL ENCOUNTER (OUTPATIENT)
Facility: HOSPITAL | Age: 42
Setting detail: OUTPATIENT SURGERY
Discharge: HOME | End: 2022-12-06
Attending: INTERNAL MEDICINE | Admitting: INTERNAL MEDICINE

## 2022-12-06 VITALS
RESPIRATION RATE: 20 BRPM | SYSTOLIC BLOOD PRESSURE: 116 MMHG | OXYGEN SATURATION: 98 % | HEART RATE: 80 BPM | DIASTOLIC BLOOD PRESSURE: 68 MMHG | TEMPERATURE: 98 F

## 2022-12-06 DIAGNOSIS — I42.0 DILATED CARDIOMYOPATHY (HCC): Primary | ICD-10-CM

## 2022-12-06 DIAGNOSIS — I50.20 HEART FAILURE WITH REDUCED EJECTION FRACTION (HCC): ICD-10-CM

## 2022-12-06 LAB
ANION GAP SERPL CALCULATED.3IONS-SCNC: 9 MMOL/L (ref 4–13)
BUN SERPL-MCNC: 21 MG/DL (ref 5–25)
CALCIUM SERPL-MCNC: 9.1 MG/DL (ref 8.4–10.2)
CHLORIDE SERPL-SCNC: 106 MMOL/L (ref 96–108)
CO2 SERPL-SCNC: 24 MMOL/L (ref 21–32)
CREAT SERPL-MCNC: 1.18 MG/DL (ref 0.6–1.3)
GFR SERPL CREATININE-BSD FRML MDRD: 75 ML/MIN/1.73SQ M
GLUCOSE P FAST SERPL-MCNC: 93 MG/DL (ref 65–99)
GLUCOSE SERPL-MCNC: 93 MG/DL (ref 65–140)
POTASSIUM SERPL-SCNC: 4.2 MMOL/L (ref 3.5–5.3)
SODIUM SERPL-SCNC: 139 MMOL/L (ref 135–147)

## 2022-12-06 RX ORDER — FENTANYL CITRATE 50 UG/ML
INJECTION, SOLUTION INTRAMUSCULAR; INTRAVENOUS CODE/TRAUMA/SEDATION MEDICATION
Status: DISCONTINUED | OUTPATIENT
Start: 2022-12-06 | End: 2022-12-06 | Stop reason: HOSPADM

## 2022-12-06 RX ORDER — CLOPIDOGREL BISULFATE 75 MG/1
600 TABLET ORAL ONCE
Status: COMPLETED | OUTPATIENT
Start: 2022-12-06 | End: 2022-12-06

## 2022-12-06 RX ORDER — SODIUM CHLORIDE 9 MG/ML
75 INJECTION, SOLUTION INTRAVENOUS CONTINUOUS
Status: DISCONTINUED | OUTPATIENT
Start: 2022-12-06 | End: 2022-12-06

## 2022-12-06 RX ORDER — SODIUM CHLORIDE 9 MG/ML
100 INJECTION, SOLUTION INTRAVENOUS CONTINUOUS
Status: DISPENSED | OUTPATIENT
Start: 2022-12-06 | End: 2022-12-06

## 2022-12-06 RX ORDER — MIDAZOLAM HYDROCHLORIDE 2 MG/2ML
INJECTION, SOLUTION INTRAMUSCULAR; INTRAVENOUS CODE/TRAUMA/SEDATION MEDICATION
Status: DISCONTINUED | OUTPATIENT
Start: 2022-12-06 | End: 2022-12-06 | Stop reason: HOSPADM

## 2022-12-06 RX ORDER — SODIUM CHLORIDE 9 MG/ML
100 INJECTION, SOLUTION INTRAVENOUS CONTINUOUS
Status: DISCONTINUED | OUTPATIENT
Start: 2022-12-06 | End: 2022-12-06

## 2022-12-06 RX ORDER — HEPARIN SODIUM 1000 [USP'U]/ML
INJECTION, SOLUTION INTRAVENOUS; SUBCUTANEOUS CODE/TRAUMA/SEDATION MEDICATION
Status: DISCONTINUED | OUTPATIENT
Start: 2022-12-06 | End: 2022-12-06 | Stop reason: HOSPADM

## 2022-12-06 RX ORDER — ONDANSETRON 2 MG/ML
4 INJECTION INTRAMUSCULAR; INTRAVENOUS EVERY 6 HOURS PRN
Status: DISCONTINUED | OUTPATIENT
Start: 2022-12-06 | End: 2022-12-06 | Stop reason: HOSPADM

## 2022-12-06 RX ORDER — ASPIRIN 81 MG/1
324 TABLET, CHEWABLE ORAL ONCE
Status: COMPLETED | OUTPATIENT
Start: 2022-12-06 | End: 2022-12-06

## 2022-12-06 RX ORDER — MAGNESIUM HYDROXIDE/ALUMINUM HYDROXICE/SIMETHICONE 120; 1200; 1200 MG/30ML; MG/30ML; MG/30ML
30 SUSPENSION ORAL EVERY 6 HOURS PRN
Status: DISCONTINUED | OUTPATIENT
Start: 2022-12-06 | End: 2022-12-06 | Stop reason: HOSPADM

## 2022-12-06 RX ORDER — LIDOCAINE HYDROCHLORIDE 10 MG/ML
INJECTION, SOLUTION EPIDURAL; INFILTRATION; INTRACAUDAL; PERINEURAL CODE/TRAUMA/SEDATION MEDICATION
Status: DISCONTINUED | OUTPATIENT
Start: 2022-12-06 | End: 2022-12-06 | Stop reason: HOSPADM

## 2022-12-06 RX ORDER — NITROGLYCERIN 20 MG/100ML
INJECTION INTRAVENOUS CODE/TRAUMA/SEDATION MEDICATION
Status: DISCONTINUED | OUTPATIENT
Start: 2022-12-06 | End: 2022-12-06 | Stop reason: HOSPADM

## 2022-12-06 RX ORDER — VERAPAMIL HCL 2.5 MG/ML
AMPUL (ML) INTRAVENOUS CODE/TRAUMA/SEDATION MEDICATION
Status: DISCONTINUED | OUTPATIENT
Start: 2022-12-06 | End: 2022-12-06 | Stop reason: HOSPADM

## 2022-12-06 RX ADMIN — CLOPIDOGREL BISULFATE 600 MG: 75 TABLET ORAL at 10:06

## 2022-12-06 RX ADMIN — ASPIRIN 81 MG 324 MG: 81 TABLET ORAL at 10:04

## 2022-12-06 RX ADMIN — SODIUM CHLORIDE 75 ML/HR: 0.9 INJECTION, SOLUTION INTRAVENOUS at 10:12

## 2022-12-06 NOTE — PROGRESS NOTES
Patient prepared for transport via stretcher with his lifevest to SPU  Patient tolerated procedure without any difficulty  Right wrist dry and intact with 8ml of air in the band  Dr Lucia Mir spoke with the patient post procedure  Report to be given at bedside  Site to be verified with receiving RN  Any change from the above assessment will be documented by receiving RN

## 2022-12-09 NOTE — PROGRESS NOTES
Advanced Heart Failure Outpatient Progress Note - Daphney Zendejas 36 y o  male MRN: [id number]    Encounter: [id number]      Assessment/Plan:    Patient Active Problem List    Diagnosis Date Noted   • Dilated cardiomyopathy ([facility] ) 12/06/2022   • Heart failure with reduced ejection fraction ([facility] ) 11/22/2022   • Mixed hyperlipidemia 11/22/2022   • Suspected sleep apnea 11/22/2022   • Elevated BP without diagnosis of hypertension 11/22/2022       # Newly diagnosed eart failure reduced ejection fraction, Stage C, NYHA II  Etiology: Nonischemic  Suspect chronic given dilated LV  Unclear etiology  Reports father had a heart attack/sudden death at 65 y/o  No history of heavy alcohol or drug use  No supplements or steroid use  Tachycardic but sinus rhythm with no conduction abnormality on EKG  TSH normal    Euvolemic, warm on exam    Weight: 284 lbs 11/21/22; 277 lbs 11/30/22; 271 lbs  12/12/22  BNP: 349 11/4/22    Studies- personally reviewed by me    Serology: TSH normal, iron panel normal    Cardiac cath 12/6/22: normal coronaries, LVEDP 18mmHg    Echocardiogram 11/17/22  LVEF: 15%  LVIDd: 7 3cm  RV: mildly dilated, mildly reduced systolic function  MR: trace  PASP: inadequate TR envelope for estimation  RVOT: no notching  Other: mildly increased wall thickness    EKG: sinus tachycardia, IVCD - QRSd 130 msec, T wave abnormality in the anterolateral leads    Diet:  2 g sodium diet  2000 mL fluid restriction    Neurohormonal Blockade:  --Beta-Blocker: metoprolol succinate 25 mg BID  --ACEi, ARB or ARNi: entresto 24-26mg BID  --Aldosterone Receptor Blocker:   --SGLT2 Inhibitor:  --Diuretic: furosemide 20mg daily as needed    Sudden Cardiac Death Risk Reduction:  --ICD: LifeVest     Electrical Resynchronization:  --Candidacy for BiV device: IVCD QRSd 130 msec    Advanced Therapies (if appropriate):   --We will continue to monitor    # Mixed hyperlipidemia  6/20/22:  HDL 44   ASCVD risk: 3 2%  # Sinus tachycardia, likely maladaptive response  # Suspected sleep apnea  # Hypertension, controlled    Today's Plan:  Continue current medications  We will price check farxiga or jardiance  Cardiac MRI and sleep study as scheduled  2g sodium diet  2L fluid restriction  Daily weights      HPI:   36year old male with no known significant PMH who was recently diagnosed with cardiomyopathy  He pulled a muscle at work and had pinged nerve  He had some other testing while being evaluated and found to have an abnormal EKG  He was also noticing shortness of breath laying down and PND at that time  He subsequently had an echocardiogram which showed dilated LV at 7 3cm and severely reduced ejection fraction at 15%  Last wellness check showed mild tachycardia in 2021  Otherwise no other cardiac or medical history  Blood pressures were normal  He used to work out doing 20 mins of cardio and 20 mins of lifting until he got busy  Recently noticing shortness of breath when running up the steps  Continues to have orthopnea and PND  No leg swelling  Some abdominal fullness  FH: father  at of "heart attack" at 64; unclear if with CAD; no other known heart disease or sudden death in family  SH: quit smoking 6 years ago, smoked 1/2 ppd x 17 years; no alcohol use; used marijuana during teenage years  No flu-like or viral syndrome  Got covid vaccines 2021 and 22: Here for follow up  On last visit: Started entresto 24-26mg, metoprolol and furosemide  22 Na 139 K 4 1 creatinine 1 4  He is overall feeling better  He is able to sleep better, no orthopnea or leg swelling  Weight down 7 lbs  Tolerating medications  Scheduled for cardiac cath, cardiac MRI and sleep study  Interval History:  22: here for follow up  Increased metoprolol succinate 25mg two times a day and decreased furosemide to 20mg daily as needed for weight gain  Cardiac cath showed normal coronaries, LVEDP 18mmHg   22 Na 139 K 4 2 and creatinine 1 18  Walking up a flight of stairs no shortness of breath  No orthopnea or PND    History reviewed  No pertinent past medical history  Review of Systems   Constitutional: Negative for chills, fatigue and fever  HENT: Negative for ear pain and sore throat  Eyes: Negative for pain and visual disturbance  Respiratory: Negative for cough and shortness of breath  Cardiovascular: Negative for chest pain, palpitations and leg swelling  Gastrointestinal: Negative for abdominal distention, abdominal pain, nausea and vomiting  Genitourinary: Negative for dysuria and hematuria  Musculoskeletal: Negative for arthralgias and back pain  Skin: Negative for color change and rash  Neurological: Negative for dizziness, seizures and syncope  All other systems reviewed and are negative  No Known Allergies        Current Outpatient Medications:   •  furosemide (LASIX) 20 mg tablet, As needed for weight gain of 3 lbs in a day or 5 lbs in 5-7 days, Disp: 30 tablet, Rfl: 1  •  metoprolol succinate (TOPROL-XL) 25 mg 24 hr tablet, Take 1 tablet (25 mg total) by mouth 2 (two) times a day, Disp: 180 tablet, Rfl: 1  •  sacubitril-valsartan (Entresto) 24-26 MG TABS, Take 1 tablet by mouth 2 (two) times a day, Disp: 60 tablet, Rfl: 1    Social History     Socioeconomic History   • Marital status: /Civil Union     Spouse name: Not on file   • Number of children: Not on file   • Years of education: Not on file   • Highest education level: Not on file   Occupational History   • Not on file   Tobacco Use   • Smoking status: Former   • Smokeless tobacco: Never   Substance and Sexual Activity   • Alcohol use: Not Currently   • Drug use: Never   • Sexual activity: Not on file   Other Topics Concern   • Not on file   Social History Narrative   • Not on file     Social Determinants of Health     Financial Resource Strain: Not on file   Food Insecurity: Not on file   Transportation Needs: Not on file Physical Activity: Not on file   Stress: Not on file   Social Connections: Not on file   Intimate Partner Violence: Not on file   Housing Stability: Not on file       History reviewed  No pertinent family history  Physical Exam:    Vitals: Blood pressure 108/72, pulse 86, height 6' 2" (1 88 m), weight 123 kg (271 lb 14 4 oz), SpO2 100 %  , Body mass index is 34 91 kg/m² ,   Wt Readings from Last 3 Encounters:   12/12/22 123 kg (271 lb 14 4 oz)   11/30/22 126 kg (277 lb)   11/21/22 129 kg (284 lb)       Physical Exam  Constitutional:       General: He is not in acute distress  Appearance: Normal appearance  HENT:      Head: Normocephalic and atraumatic  Mouth/Throat:      Mouth: Mucous membranes are moist    Eyes:      General: No scleral icterus  Extraocular Movements: Extraocular movements intact  Cardiovascular:      Rate and Rhythm: Normal rate and regular rhythm  Pulses: Normal pulses  Heart sounds: S1 normal and S2 normal  No murmur heard  No friction rub  No gallop  Comments: Nonelevated JVP  Pulmonary:      Breath sounds: Normal breath sounds  Abdominal:      General: There is no distension  Palpations: Abdomen is soft  Tenderness: There is no abdominal tenderness  There is no guarding or rebound  Musculoskeletal:         General: Normal range of motion  Cervical back: Neck supple  Right lower leg: No edema  Left lower leg: No edema  Skin:     General: Skin is warm and dry  Capillary Refill: Capillary refill takes less than 2 seconds  Neurological:      General: No focal deficit present  Mental Status: He is alert and oriented to person, place, and time     Psychiatric:         Mood and Affect: Mood normal          Labs & Results:    Lab Results   Component Value Date    WBC 8 08 11/25/2022    HGB 16 9 11/25/2022    HCT 52 5 (H) 11/25/2022    MCV 87 11/25/2022     11/25/2022     Lab Results   Component Value Date    SODIUM 139 12/06/2022    K 4 2 12/06/2022     12/06/2022    CO2 24 12/06/2022    BUN 21 12/06/2022    CREATININE 1 18 12/06/2022    GLUC 93 12/06/2022    CALCIUM 9 1 12/06/2022     No results found for: NTBNP   No results found for: CHOLESTEROL  No results found for: HDL  No results found for: TRIG  No results found for: [place]    EKG personally reviewed by Marina Baron MD      Counseling / Coordination of Care  Time spent today 25 minutes  Greater than 50% of total time was spent with the patient and / or family counseling and / or coordination of care  We discussed diagnoses, most recent studies and any changes in treatment    Thank you for the opportunity to participate in the care of this patient      Sergio Rose MD  ADVANCED HEART FAILURE AND MECHANICAL CIRCULATORY SUPPORT  [facility]

## 2022-12-12 ENCOUNTER — OFFICE VISIT (OUTPATIENT)
Dept: CARDIOLOGY CLINIC | Facility: CLINIC | Age: 42
End: 2022-12-12

## 2022-12-12 VITALS
HEIGHT: 74 IN | BODY MASS INDEX: 34.89 KG/M2 | WEIGHT: 271.9 LBS | OXYGEN SATURATION: 100 % | HEART RATE: 86 BPM | DIASTOLIC BLOOD PRESSURE: 72 MMHG | SYSTOLIC BLOOD PRESSURE: 108 MMHG

## 2022-12-12 DIAGNOSIS — I10 PRIMARY HYPERTENSION: ICD-10-CM

## 2022-12-12 DIAGNOSIS — R29.818 SUSPECTED SLEEP APNEA: ICD-10-CM

## 2022-12-12 DIAGNOSIS — E78.2 MIXED HYPERLIPIDEMIA: ICD-10-CM

## 2022-12-12 DIAGNOSIS — I50.20 HEART FAILURE WITH REDUCED EJECTION FRACTION (HCC): Primary | ICD-10-CM

## 2022-12-12 NOTE — PATIENT INSTRUCTIONS
Continue current medications  We will price check farxiga or jardiance  Cardiac MRI and sleep study as scheduled  2g sodium diet  2L fluid restriction  Daily weights

## 2022-12-13 ENCOUNTER — TELEPHONE (OUTPATIENT)
Dept: CARDIOLOGY CLINIC | Facility: CLINIC | Age: 42
End: 2022-12-13

## 2022-12-13 ENCOUNTER — CLINICAL SUPPORT (OUTPATIENT)
Dept: CARDIOLOGY CLINIC | Facility: CLINIC | Age: 42
End: 2022-12-13

## 2022-12-13 DIAGNOSIS — I50.20 HEART FAILURE WITH REDUCED EJECTION FRACTION (HCC): Primary | ICD-10-CM

## 2022-12-13 DIAGNOSIS — I50.20 HEART FAILURE WITH REDUCED EJECTION FRACTION (HCC): ICD-10-CM

## 2022-12-13 NOTE — TELEPHONE ENCOUNTER
Per [employer] Jardiance 10 mg does not require a prior authorization and will has a current cost of $0

## 2022-12-13 NOTE — TELEPHONE ENCOUNTER
Spoke to patient regarding starting Jardiance and going for BMP one week after starting med  He verbalized understanding

## 2022-12-16 ENCOUNTER — NURSE TRIAGE (OUTPATIENT)
Dept: OTHER | Facility: OTHER | Age: 42
End: 2022-12-16

## 2022-12-16 DIAGNOSIS — I50.20 HEART FAILURE WITH REDUCED EJECTION FRACTION (HCC): ICD-10-CM

## 2022-12-16 RX ORDER — METOPROLOL SUCCINATE 25 MG/1
25 TABLET, EXTENDED RELEASE ORAL 2 TIMES DAILY
Qty: 14 TABLET | Refills: 0 | Status: SHIPPED | OUTPATIENT
Start: 2022-12-16 | End: 2022-12-19 | Stop reason: SDUPTHER

## 2022-12-17 NOTE — TELEPHONE ENCOUNTER
Reason for Disposition  • [1] Prescription prescribed recently is not at pharmacy AND [2] triager has access to patient's EMR AND [3] prescription is recorded in the EMR    Answer Assessment - Initial Assessment Questions  1  DRUG NAME: "What medicine do you need to have refilled?"      Metoprolol 25 MG BID  2  REFILLS REMAINING: "How many refills are remaining?" (Note: The label on the medicine or pill bottle will show how many refills are remaining  If there are no refills remaining, then a renewal may be needed )     n/a  3  EXPIRATION DATE: "What is the expiration date?" (Note: The label states when the prescription will , and thus can no longer be refilled )     n/a  4  PRESCRIBING HCP: "Who prescribed it?" Reason: If prescribed by specialist, call should be referred to that group  Cardiology   5   SYMPTOMS: "Do you have any symptoms?"     Denies    Protocols used: MED[facility] RE[facility]ENEWAL [facility]

## 2022-12-17 NOTE — TELEPHONE ENCOUNTER
Regarding: med refill  ----- Message from Sofi Escoto sent at 12/16/2022 11:27 PM EST -----  "I need a refill for metoprolol, I have until Sunday "

## 2022-12-17 NOTE — TELEPHONE ENCOUNTER
Medication refill request  Medication marked as essential  Courtesy seven-day supply given per protocol  Sent to pharmacy (verified) of choice  Patient informed, verbalized understanding

## 2022-12-19 DIAGNOSIS — I50.20 HEART FAILURE WITH REDUCED EJECTION FRACTION (HCC): ICD-10-CM

## 2022-12-19 RX ORDER — METOPROLOL SUCCINATE 25 MG/1
25 TABLET, EXTENDED RELEASE ORAL 2 TIMES DAILY
Qty: 180 TABLET | Refills: 3 | Status: SHIPPED | OUTPATIENT
Start: 2022-12-19

## 2022-12-23 ENCOUNTER — APPOINTMENT (OUTPATIENT)
Dept: LAB | Facility: CLINIC | Age: 42
End: 2022-12-23

## 2022-12-23 LAB
ANION GAP SERPL CALCULATED.3IONS-SCNC: 7 MMOL/L (ref 4–13)
BUN SERPL-MCNC: 22 MG/DL (ref 5–25)
CALCIUM SERPL-MCNC: 9.4 MG/DL (ref 8.4–10.2)
CHLORIDE SERPL-SCNC: 107 MMOL/L (ref 96–108)
CO2 SERPL-SCNC: 26 MMOL/L (ref 21–32)
CREAT SERPL-MCNC: 1.17 MG/DL (ref 0.6–1.3)
GFR SERPL CREATININE-BSD FRML MDRD: 75 ML/MIN/1.73SQ M
GLUCOSE SERPL-MCNC: 87 MG/DL (ref 65–140)
POTASSIUM SERPL-SCNC: 3.9 MMOL/L (ref 3.5–5.3)
SODIUM SERPL-SCNC: 140 MMOL/L (ref 135–147)

## 2022-12-27 ENCOUNTER — HOSPITAL ENCOUNTER (OUTPATIENT)
Dept: MRI IMAGING | Facility: HOSPITAL | Age: 42
Discharge: HOME | End: 2022-12-27
Attending: INTERNAL MEDICINE

## 2022-12-27 DIAGNOSIS — I50.20 HEART FAILURE WITH REDUCED EJECTION FRACTION (HCC): ICD-10-CM

## 2022-12-27 RX ADMIN — GADOBUTROL 271 ML: 604.72 INJECTION INTRAVENOUS at 19:52

## 2023-01-05 ENCOUNTER — TELEPHONE (OUTPATIENT)
Dept: CARDIOLOGY CLINIC | Facility: CLINIC | Age: 43
End: 2023-01-05

## 2023-01-05 NOTE — TELEPHONE ENCOUNTER
----- Message from Checo Rodriguez MD sent at 1/4/2023  9:05 AM EST -----  Please let him know cardiac MRI showed reduced EF at 17%  No evidence of heart tissue inflammation, infiltrative disease or scar  Can discuss in further detail on upcoming office visit

## 2023-01-05 NOTE — TELEPHONE ENCOUNTER
Called pt and relayed message as given, Pt verbalized understanding  Pt stated they had no questions at this time

## 2023-01-09 DIAGNOSIS — I50.20 HEART FAILURE WITH REDUCED EJECTION FRACTION (HCC): ICD-10-CM

## 2023-01-10 RX ORDER — FUROSEMIDE 20 MG/1
TABLET ORAL
Qty: 30 TABLET | Refills: 1 | Status: SHIPPED | OUTPATIENT
Start: 2023-01-10

## 2023-01-11 ENCOUNTER — HOSPITAL ENCOUNTER (OUTPATIENT)
Dept: SLEEP CENTER | Facility: CLINIC | Age: 43
Discharge: HOME | End: 2023-01-11

## 2023-01-11 DIAGNOSIS — R29.818 SUSPECTED SLEEP APNEA: ICD-10-CM

## 2023-01-12 NOTE — PROGRESS NOTES
Sleep Study Documentation    Pre-Sleep Study       Sleep testing procedure explained to patient:YES    Patient napped prior to study:NO    [address] [place] worker after 12PM   Caffeine use:NO    Alcohol:Dayshift workers after 5PM: Alcohol use:NO    Typical day for patient:YES       Study Documentation    Sleep Study Indications: snoring, daytime sleepiness    Sleep Study: Diagnostic   Snore: Moderate  Supplemental O2: no      Minimum SaO2 78  Baseline SaO2 93          EKG abnormalities: no     EEG abnormalities: no    Sleep Study Recorded < 2 hours: N/A    Sleep Study Recorded > 2 hours but incomplete study: N/A    Sleep Study Recorded 6 hours but no sleep obtained: NO          Post-Sleep Study    Medication used at bedtime or during sleep study:YES other prescription medications    Patient reports time it took to fall asleep:30 to 60 minutes    Patient reports waking up during study:1 to 2 times  Patient reports returning to sleep without difficulty  Patient reports sleeping 4 to 6 hours without dreaming  Patient reports sleep during study:typical    Patient rated sleepiness: Not sleepy or tired    PAP treatment:no

## 2023-01-13 DIAGNOSIS — I50.20 HEART FAILURE WITH REDUCED EJECTION FRACTION (HCC): ICD-10-CM

## 2023-01-13 RX ORDER — SACUBITRIL AND VALSARTAN 24; 26 MG/1; MG/1
TABLET, FILM COATED ORAL
Qty: 60 TABLET | Refills: 1 | Status: SHIPPED | OUTPATIENT
Start: 2023-01-13 | End: 2023-01-18 | Stop reason: SDUPTHER

## 2023-01-18 DIAGNOSIS — I50.20 HEART FAILURE WITH REDUCED EJECTION FRACTION (HCC): ICD-10-CM

## 2023-01-18 RX ORDER — SACUBITRIL AND VALSARTAN 24; 26 MG/1; MG/1
1 TABLET, FILM COATED ORAL 2 TIMES DAILY
Qty: 180 TABLET | Refills: 3 | Status: SHIPPED | OUTPATIENT
Start: 2023-01-18

## 2023-01-19 ENCOUNTER — TELEPHONE (OUTPATIENT)
Dept: SLEEP CENTER | Facility: CLINIC | Age: 43
End: 2023-01-19

## 2023-01-19 DIAGNOSIS — G47.33 OSA (OBSTRUCTIVE SLEEP APNEA): Primary | ICD-10-CM

## 2023-01-19 NOTE — TELEPHONE ENCOUNTER
----- Message from Dyana Gay MD sent at 1/18/2023  8:42 PM EST -----  Regarding: needs consult  Hi, he has severe NIGHAT and central sleep apnea, needs urgent consult, has a significant cardiac history

## 2023-01-19 NOTE — TELEPHONE ENCOUNTER
Patient with severe NIGHAT   AHI is 66 5   Patient's Cardiologist ordered CPAP study      Patient needs to be scheduled for CPAP study and consult with Dr Shyanne Velazquez     Left call back message

## 2023-01-20 NOTE — TELEPHONE ENCOUNTER
Advised the patient sleep study results   Explained CPAP study, scheduled CPAP study and consult with Dr Nathan Serra

## 2023-01-24 ENCOUNTER — TELEPHONE (OUTPATIENT)
Dept: CARDIOLOGY CLINIC | Facility: CLINIC | Age: 43
End: 2023-01-24

## 2023-01-24 NOTE — PROGRESS NOTES
Advanced Heart Failure Outpatient Progress Note - Jarvis Estimable 39 y o  male MRN: [id number]    Encounter: [id number]      Assessment/Plan:    Patient Active Problem List    Diagnosis Date Noted   • Central sleep apnea with Cheyne-Moore respiration    • NIGHAT (obstructive sleep apnea)    • Dilated cardiomyopathy ([facility] [address] ) 12/06/2022   • Heart failure with reduced ejection fraction ([facility] [address] ) 11/22/2022   • Mixed hyperlipidemia 11/22/2022   • Suspected sleep apnea 11/22/2022   • Elevated BP without diagnosis of hypertension 11/22/2022       # Newly diagnosed eart failure reduced ejection fraction, Stage C, NYHA II  Etiology: Nonischemic  Suspect chronic given dilated LV  Unclear etiology  Reports father had a heart attack/sudden death at 65 y/o  No history of heavy alcohol or drug use  No supplements or steroid use  Tachycardic but sinus rhythm with no conduction abnormality on EKG  TSH normal  No inflammation, infiltrative disease or scarring on cardiac MRI  Euvolemic, warm on exam    Weight: 284 lbs 11/21/22; 277 lbs 11/30/22; 271 lbs  12/12/22; 263 lbs 1/25/23  BNP: 349 11/4/22    Studies- personally reviewed by me    Serology: TSH normal, iron panel normal    Cardiac MRI 12/27/22: LVEF 17%  LVIDD 7 8 cm  Top normal RV size with mildly reduced systolic function   No evidence of myocardial inflammation, infiltrative disease or scarring      Cardiac cath 12/6/22: normal coronaries, LVEDP 18mmHg    Echocardiogram 11/17/22  LVEF: 15%  LVIDd: 7 3cm  RV: mildly dilated, mildly reduced systolic function  MR: trace  PASP: inadequate TR envelope for estimation  RVOT: no notching  Other: mildly increased wall thickness    EKG: sinus tachycardia, IVCD - QRSd 130 msec, T wave abnormality in the anterolateral leads    Diet:  2 g sodium diet  2000 mL fluid restriction    Neurohormonal Blockade:  --Beta-Blocker: metoprolol succinate 25 mg BID  --ACEi, ARB or ARNi: entresto 24-26mg BID  --Aldosterone Receptor Blocker:   --SGLT2 Inhibitor: Jardiance 10 mg daily  --Diuretic: furosemide 20mg daily as needed    Sudden Cardiac Death Risk Reduction:  --ICD: LifeVest     Electrical Resynchronization:  --Candidacy for BiV device: IVCD QRSd 130 msec    Advanced Therapies (if appropriate): --We will continue to monitor    # Mixed hyperlipidemia  22:  HDL 44   ASCVD risk: 3 2%  # Sinus tachycardia, likely maladaptive response  # Severe sleep apnea, on sleep study 23  # Hypertension, BP above goal today    Today's Plan:  Start eplerenone 25mg once a day  Obtain BMP in 1 week  Continue LifeVest  Echocardiogram early March  2g sodium diet  2L fluid restriction  Daily weights  CPAP study and sleep medicine consultation as scheduled      HPI:   36year old male with no known significant PMH who was recently diagnosed with cardiomyopathy  He pulled a muscle at work and had pinged nerve  He had some other testing while being evaluated and found to have an abnormal EKG  He was also noticing shortness of breath laying down and PND at that time  He subsequently had an echocardiogram which showed dilated LV at 7 3cm and severely reduced ejection fraction at 15%  Last wellness check showed mild tachycardia in 2021  Otherwise no other cardiac or medical history  Blood pressures were normal  He used to work out doing 20 mins of cardio and 20 mins of lifting until he got busy  Recently noticing shortness of breath when running up the steps  Continues to have orthopnea and PND  No leg swelling  Some abdominal fullness  FH: father  at of "heart attack" at 64; unclear if with CAD; no other known heart disease or sudden death in family  SH: quit smoking 6 years ago, smoked 1/2 ppd x 17 years; no alcohol use; used marijuana during teenage years  No flu-like or viral syndrome  Got covid vaccines 2021 and 22: Here for follow up  On last visit: Started entresto 24-26mg, metoprolol and furosemide   22 Na 139 K 4 1 creatinine 1 4  He is overall feeling better  He is able to sleep better, no orthopnea or leg swelling  Weight down 7 lbs  Tolerating medications  Scheduled for cardiac cath, cardiac MRI and sleep study  12/9/22: here for follow up  Increased metoprolol succinate 25mg two times a day and decreased furosemide to 20mg daily as needed for weight gain  Cardiac cath showed normal coronaries, LVEDP 18mmHg  12/6/22 Na 139 K 4 2 and creatinine 1 18  Walking up a flight of stairs no shortness of breath  No orthopnea or PND    Interval History:  1/25/23: Here for follow up  Overall doing well  Staying active, power walking for 20mins and does hand bike 20 mins  Sleep study showed severe sleep apnea, scheduled for CPAP study and sleep medicine consult  No shortness of breath, leg swelling, PND or orthopnea  Taking medications as prescribed  PRN lasix dose last week  No past medical history on file  Review of Systems   Constitutional: Negative for chills, fatigue and fever  HENT: Negative for ear pain and sore throat  Eyes: Negative for pain and visual disturbance  Respiratory: Negative for cough and shortness of breath  Cardiovascular: Negative for chest pain, palpitations and leg swelling  Gastrointestinal: Negative for abdominal distention, abdominal pain, nausea and vomiting  Genitourinary: Negative for dysuria and hematuria  Musculoskeletal: Negative for arthralgias and back pain  Skin: Negative for color change and rash  Neurological: Negative for dizziness, seizures and syncope  All other systems reviewed and are negative  No Known Allergies        Current Outpatient Medications:   •  Empagliflozin (JARDIANCE) 10 MG TABS tablet, Take 1 tablet (10 mg total) by mouth every morning, Disp: 30 tablet, Rfl: 3  •  eplerenone (INSPRA) 25 mg tablet, Take 1 tablet (25 mg total) by mouth daily, Disp: 30 tablet, Rfl: 4  •  furosemide (LASIX) 20 mg tablet, One tablet daily as needed, Disp: 30 tablet, Rfl: 1  •  metoprolol succinate (TOPROL-XL) 25 mg 24 hr tablet, Take 1 tablet (25 mg total) by mouth 2 (two) times a day, Disp: 180 tablet, Rfl: 3  •  sacubitril-valsartan (Entresto) 24-26 MG TABS, Take 1 tablet by mouth 2 (two) times a day, Disp: 180 tablet, Rfl: 3    Social History     Socioeconomic History   • Marital status: /Civil Union     Spouse name: Not on file   • Number of children: Not on file   • Years of education: Not on file   • Highest education level: Not on file   Occupational History   • Not on file   Tobacco Use   • Smoking status: Former   • Smokeless tobacco: Never   Substance and Sexual Activity   • Alcohol use: Not Currently   • Drug use: Never   • Sexual activity: Not on file   Other Topics Concern   • Not on file   Social History Narrative   • Not on file     Social Determinants of Health     Financial Resource Strain: Not on file   Food Insecurity: Not on file   Transportation Needs: Not on file   Physical Activity: Not on file   Stress: Not on file   Social Connections: Not on file   Intimate Partner Violence: Not on file   Housing Stability: Not on file       No family history on file  Physical Exam:    Vitals: Blood pressure 130/90, pulse 99, height 6' 2" (1 88 m), weight 119 kg (263 lb), SpO2 99 %  , Body mass index is 33 77 kg/m² ,   Wt Readings from Last 3 Encounters:   01/25/23 119 kg (263 lb)   12/12/22 123 kg (271 lb 14 4 oz)   11/30/22 126 kg (277 lb)       Physical Exam  Constitutional:       General: He is not in acute distress  Appearance: Normal appearance  HENT:      Head: Normocephalic and atraumatic  Mouth/Throat:      Mouth: Mucous membranes are moist    Eyes:      General: No scleral icterus  Extraocular Movements: Extraocular movements intact  Cardiovascular:      Rate and Rhythm: Normal rate and regular rhythm  Pulses: Normal pulses  Heart sounds: S1 normal and S2 normal  No murmur heard  No friction rub  No gallop  Comments: Nonelevated JVP  Pulmonary:      Breath sounds: Normal breath sounds  Abdominal:      General: There is no distension  Palpations: Abdomen is soft  Tenderness: There is no abdominal tenderness  There is no guarding or rebound  Musculoskeletal:         General: Normal range of motion  Cervical back: Neck supple  Right lower leg: No edema  Left lower leg: No edema  Skin:     General: Skin is warm and dry  Capillary Refill: Capillary refill takes less than 2 seconds  Neurological:      General: No focal deficit present  Mental Status: He is alert and oriented to person, place, and time  Psychiatric:         Mood and Affect: Mood normal          Labs & Results:    Lab Results   Component Value Date    WBC 8 08 11/25/2022    HGB 16 9 11/25/2022    HCT 52 5 (H) 11/25/2022    MCV 87 11/25/2022     11/25/2022     Lab Results   Component Value Date    SODIUM 140 12/23/2022    K 3 9 12/23/2022     12/23/2022    CO2 26 12/23/2022    BUN 22 12/23/2022    CREATININE 1 17 12/23/2022    GLUC 87 12/23/2022    CALCIUM 9 4 12/23/2022     No results found for: NTBNP   No results found for: CHOLESTEROL  No results found for: HDL  No results found for: TRIG  No results found for: [place]    EKG personally reviewed by Carlos Major MD      Counseling / Coordination of Care  Time spent today 25 minutes  Greater than 50% of total time was spent with the patient and / or family counseling and / or coordination of care  We discussed diagnoses, most recent studies and any changes in treatment    Thank you for the opportunity to participate in the care of this patient      Mamie Schrader MD  ADVANCED HEART FAILURE AND MECHANICAL CIRCULATORY SUPPORT  [facility]

## 2023-01-25 ENCOUNTER — OFFICE VISIT (OUTPATIENT)
Dept: CARDIOLOGY CLINIC | Facility: CLINIC | Age: 43
End: 2023-01-25

## 2023-01-25 VITALS
SYSTOLIC BLOOD PRESSURE: 130 MMHG | BODY MASS INDEX: 33.75 KG/M2 | WEIGHT: 263 LBS | HEART RATE: 99 BPM | OXYGEN SATURATION: 99 % | HEIGHT: 74 IN | DIASTOLIC BLOOD PRESSURE: 90 MMHG

## 2023-01-25 DIAGNOSIS — I10 PRIMARY HYPERTENSION: ICD-10-CM

## 2023-01-25 DIAGNOSIS — E78.2 MIXED HYPERLIPIDEMIA: ICD-10-CM

## 2023-01-25 DIAGNOSIS — I50.20 HEART FAILURE WITH REDUCED EJECTION FRACTION (HCC): Primary | ICD-10-CM

## 2023-01-25 DIAGNOSIS — G47.33 OSA (OBSTRUCTIVE SLEEP APNEA): ICD-10-CM

## 2023-01-25 RX ORDER — EPLERENONE 25 MG/1
25 TABLET, FILM COATED ORAL DAILY
Qty: 30 TABLET | Refills: 4 | Status: SHIPPED | OUTPATIENT
Start: 2023-01-25

## 2023-01-25 NOTE — PATIENT INSTRUCTIONS
Start eplerenone 25mg once a day  Obtain BMP in 1 week  Continue LifeVest  Echocardiogram early March  2g sodium diet  2L fluid restriction  Daily weights

## 2023-01-26 ENCOUNTER — TELEPHONE (OUTPATIENT)
Dept: SLEEP CENTER | Facility: CLINIC | Age: 43
End: 2023-01-26

## 2023-01-26 NOTE — TELEPHONE ENCOUNTER
----- Message from Jennifer Bolanos MD sent at 1/25/2023  8:38 PM EST -----  approved  ----- Message -----  From: Rosa Ley  Sent: 3/30/6906  10:41 AM EST  To: Sleep Medicine [facility] Provider    This CPAP sleep study needs approval      If approved please sign and return to clerical pool  If denied please include reasons why  Also provide alternative testing if warranted  Please sign and return to clerical pool

## 2023-02-04 ENCOUNTER — APPOINTMENT (OUTPATIENT)
Dept: LAB | Facility: CLINIC | Age: 43
End: 2023-02-04

## 2023-02-04 DIAGNOSIS — I50.20 HEART FAILURE WITH REDUCED EJECTION FRACTION (HCC): ICD-10-CM

## 2023-02-04 LAB
ANION GAP SERPL CALCULATED.3IONS-SCNC: 7 MMOL/L (ref 4–13)
BUN SERPL-MCNC: 18 MG/DL (ref 5–25)
CALCIUM SERPL-MCNC: 9.3 MG/DL (ref 8.4–10.2)
CHLORIDE SERPL-SCNC: 105 MMOL/L (ref 96–108)
CO2 SERPL-SCNC: 28 MMOL/L (ref 21–32)
CREAT SERPL-MCNC: 1.12 MG/DL (ref 0.6–1.3)
GFR SERPL CREATININE-BSD FRML MDRD: 79 ML/MIN/1.73SQ M
GLUCOSE SERPL-MCNC: 94 MG/DL (ref 65–140)
POTASSIUM SERPL-SCNC: 3.4 MMOL/L (ref 3.5–5.3)
SODIUM SERPL-SCNC: 140 MMOL/L (ref 135–147)

## 2023-02-05 DIAGNOSIS — I50.20 HEART FAILURE WITH REDUCED EJECTION FRACTION (HCC): ICD-10-CM

## 2023-02-06 ENCOUNTER — TELEPHONE (OUTPATIENT)
Dept: CARDIOLOGY CLINIC | Facility: CLINIC | Age: 43
End: 2023-02-06

## 2023-02-06 DIAGNOSIS — I50.20 HEART FAILURE WITH REDUCED EJECTION FRACTION (HCC): Primary | ICD-10-CM

## 2023-02-06 RX ORDER — EMPAGLIFLOZIN 10 MG/1
TABLET, FILM COATED ORAL
Qty: 90 TABLET | Refills: 1 | Status: SHIPPED | OUTPATIENT
Start: 2023-02-06

## 2023-02-06 RX ORDER — POTASSIUM CHLORIDE 20 MEQ/1
40 TABLET, EXTENDED RELEASE ORAL DAILY
Qty: 60 TABLET | Refills: 3 | Status: SHIPPED | OUTPATIENT
Start: 2023-02-06

## 2023-02-06 NOTE — TELEPHONE ENCOUNTER
Called pt and relayed message as given, pt verbalized understanding  Pt asked if you would like him to follow-up with another BMP one week after taking medication  Please advise, thank you

## 2023-02-06 NOTE — TELEPHONE ENCOUNTER
----- Message from Sunny Ruggiero MD sent at 2/6/2023  8:10 AM EST -----  Please let him know BMP showed low potassium  Start potassium 40 meq daily  Order placed

## 2023-02-13 DIAGNOSIS — E78.2 MIXED HYPERLIPIDEMIA: Primary | ICD-10-CM

## 2023-02-13 DIAGNOSIS — I50.20 HEART FAILURE WITH REDUCED EJECTION FRACTION (HCC): ICD-10-CM

## 2023-02-14 ENCOUNTER — APPOINTMENT (OUTPATIENT)
Dept: LAB | Facility: CLINIC | Age: 43
End: 2023-02-14

## 2023-02-14 LAB
ANION GAP SERPL CALCULATED.3IONS-SCNC: 8 MMOL/L (ref 4–13)
BUN SERPL-MCNC: 17 MG/DL (ref 5–25)
CALCIUM SERPL-MCNC: 9.2 MG/DL (ref 8.4–10.2)
CHLORIDE SERPL-SCNC: 104 MMOL/L (ref 96–108)
CO2 SERPL-SCNC: 24 MMOL/L (ref 21–32)
CREAT SERPL-MCNC: 1.21 MG/DL (ref 0.6–1.3)
GFR SERPL CREATININE-BSD FRML MDRD: 72 ML/MIN/1.73SQ M
GLUCOSE SERPL-MCNC: 86 MG/DL (ref 65–140)
POTASSIUM SERPL-SCNC: 4.2 MMOL/L (ref 3.5–5.3)
SODIUM SERPL-SCNC: 136 MMOL/L (ref 135–147)

## 2023-02-15 ENCOUNTER — TELEPHONE (OUTPATIENT)
Dept: CARDIOLOGY CLINIC | Facility: CLINIC | Age: 43
End: 2023-02-15

## 2023-02-15 NOTE — TELEPHONE ENCOUNTER
----- Message from David Dowell sent at 2/15/2023 10:37 AM EST -----    ----- Message -----  From: Sunny Ruggiero MD  Sent: 2/15/2023  10:28 AM EST  To: Cardiology [facility]    Please let him know BMP normal  Continue current medications

## 2023-02-15 NOTE — TELEPHONE ENCOUNTER
Called pt and relayed message as given  Pt verbalized understanding  Will continue medication as he been taking

## 2023-02-16 DIAGNOSIS — I50.20 HEART FAILURE WITH REDUCED EJECTION FRACTION (HCC): ICD-10-CM

## 2023-02-16 RX ORDER — EPLERENONE 25 MG/1
25 TABLET, FILM COATED ORAL DAILY
Qty: 90 TABLET | Refills: 2 | Status: SHIPPED | OUTPATIENT
Start: 2023-02-16

## 2023-03-02 ENCOUNTER — HOSPITAL ENCOUNTER (OUTPATIENT)
Dept: NON INVASIVE DIAGNOSTICS | Facility: CLINIC | Age: 43
Discharge: HOME | End: 2023-03-02

## 2023-03-02 VITALS
HEART RATE: 70 BPM | BODY MASS INDEX: 33.75 KG/M2 | DIASTOLIC BLOOD PRESSURE: 90 MMHG | SYSTOLIC BLOOD PRESSURE: 130 MMHG | WEIGHT: 263 LBS | HEIGHT: 74 IN

## 2023-03-02 DIAGNOSIS — I50.20 HEART FAILURE WITH REDUCED EJECTION FRACTION (HCC): ICD-10-CM

## 2023-03-02 LAB
AORTIC ROOT: 3.4 CM
APICAL FOUR CHAMBER EJECTION FRACTION: 27 %
E WAVE DECELERATION TIME: 222 MS
FRACTIONAL SHORTENING: 12 (ref 28–44)
INTERVENTRICULAR SEPTUM IN DIASTOLE (PARASTERNAL SHORT AXIS VIEW): 1.2 CM
INTERVENTRICULAR SEPTUM: 1.2 CM (ref 0.6–1.1)
LAAS-AP2: 15.4 CM2
LAAS-AP4: 14.4 CM2
LEFT ATRIUM AREA SYSTOLE SINGLE PLANE A4C: 13.8 CM2
LEFT ATRIUM SIZE: 4.4 CM
LEFT INTERNAL DIMENSION IN SYSTOLE: 6.1 CM (ref 2.1–4)
LEFT VENTRICULAR INTERNAL DIMENSION IN DIASTOLE: 6.9 CM (ref 3.5–6)
LEFT VENTRICULAR POSTERIOR WALL IN END DIASTOLE: 1.2 CM
LEFT VENTRICULAR STROKE VOLUME: 61 ML
LVSV (TEICH): 61 ML
MV E'TISSUE VEL-SEP: 7 CM/S
MV PEAK A VEL: 0.6 M/S
MV PEAK E VEL: 73 CM/S
MV STENOSIS PRESSURE HALF TIME: 64 MS
MV VALVE AREA P 1/2 METHOD: 3.44
RA PRESSURE ESTIMATED: 3 MMHG
RIGHT ATRIUM AREA SYSTOLE A4C: 16.3 CM2
RIGHT VENTRICLE ID DIMENSION: 4.4 CM
SL CV LEFT ATRIUM LENGTH A2C: 5.2 CM
SL CV LV EF: 30
SL CV PED ECHO LEFT VENTRICLE DIASTOLIC VOLUME (MOD BIPLANE) 2D: 245 ML
SL CV PED ECHO LEFT VENTRICLE SYSTOLIC VOLUME (MOD BIPLANE) 2D: 184 ML
TRICUSPID ANNULAR PLANE SYSTOLIC EXCURSION: 2.3 CM

## 2023-03-03 ENCOUNTER — TELEPHONE (OUTPATIENT)
Dept: CARDIOLOGY CLINIC | Facility: CLINIC | Age: 43
End: 2023-03-03

## 2023-03-03 DIAGNOSIS — I50.20 HEART FAILURE WITH REDUCED EJECTION FRACTION (HCC): Primary | ICD-10-CM

## 2023-03-03 NOTE — TELEPHONE ENCOUNTER
MD Arielle Medina, RN  Please let him know echo showed heart function remains low at 30-35%  He qualifies for ICD  EP referral for ICD evaluation   Thanks

## 2023-03-16 ENCOUNTER — OFFICE VISIT (OUTPATIENT)
Dept: CARDIOLOGY CLINIC | Facility: CLINIC | Age: 43
End: 2023-03-16

## 2023-03-16 VITALS
HEART RATE: 94 BPM | BODY MASS INDEX: 33.88 KG/M2 | DIASTOLIC BLOOD PRESSURE: 80 MMHG | SYSTOLIC BLOOD PRESSURE: 140 MMHG | HEIGHT: 74 IN | OXYGEN SATURATION: 99 % | WEIGHT: 264 LBS

## 2023-03-16 DIAGNOSIS — G47.33 OSA (OBSTRUCTIVE SLEEP APNEA): ICD-10-CM

## 2023-03-16 DIAGNOSIS — E78.2 MIXED HYPERLIPIDEMIA: ICD-10-CM

## 2023-03-16 DIAGNOSIS — I10 PRIMARY HYPERTENSION: ICD-10-CM

## 2023-03-16 DIAGNOSIS — I50.20 HEART FAILURE WITH REDUCED EJECTION FRACTION (HCC): Primary | ICD-10-CM

## 2023-03-16 RX ORDER — METOPROLOL SUCCINATE 25 MG/1
37.5 TABLET, EXTENDED RELEASE ORAL 2 TIMES DAILY
Qty: 270 TABLET | Refills: 1 | Status: SHIPPED | OUTPATIENT
Start: 2023-03-16 | End: 2023-06-14

## 2023-03-16 NOTE — PATIENT INSTRUCTIONS
Increase metoprolol to 37 5mg two times a day  Stop potassium   Obtain BMP in 1 week  Continue LifeVest  Echocardiogram in April to reassess LVEF  2g sodium diet  2L fluid restriction  Daily weights  CPAP study and sleep medicine consultation as scheduled

## 2023-03-16 NOTE — PROGRESS NOTES
Advanced Heart Failure Outpatient Progress Note - Eldon Beverage 39 y o  male MRN: [id number]    Encounter: [id number]      Assessment/Plan:    Patient Active Problem List    Diagnosis Date Noted   • Central sleep apnea with Cheyne-Moore respiration    • NIGHAT (obstructive sleep apnea)    • Dilated cardiomyopathy ([facility] [address] ) 12/06/2022   • Heart failure with reduced ejection fraction ([facility] [address] ) 11/22/2022   • Mixed hyperlipidemia 11/22/2022   • Suspected sleep apnea 11/22/2022   • Elevated BP without diagnosis of hypertension 11/22/2022       # Chronic heart failure reduced ejection fraction, Stage C, NYHA I/II  Etiology: Nonischemic  Suspect chronic given dilated LV  Unclear etiology  Reports father had a heart attack/sudden death at 65 y/o  No history of heavy alcohol or drug use  No supplements or steroid use  Tachycardic but sinus rhythm with no conduction abnormality on EKG  TSH normal  No inflammation, infiltrative disease or scarring on cardiac MRI  Euvolemic, warm on exam    Weight: 284 lbs 11/21/22; 277 lbs 11/30/22; 271 lbs  12/12/22; 263 lbs 1/25/23; 264 lbs 3/16/23  BNP: 349 11/4/22    Studies- personally reviewed by me    Serology: TSH normal, iron panel normal    Echo 3/2/23:  LVEF 30-35%  LVIDD 6 9 cm  Normal RV size and systolic function    Cardiac MRI 12/27/22: LVEF 17%  LVIDD 7 8 cm  Top normal RV size with mildly reduced systolic function   No evidence of myocardial inflammation, infiltrative disease or scarring      Cardiac cath 12/6/22: normal coronaries, LVEDP 18mmHg    Echocardiogram 11/17/22  LVEF: 15%  LVIDd: 7 3cm  RV: mildly dilated, mildly reduced systolic function  MR: trace  PASP: inadequate TR envelope for estimation  RVOT: no notching  Other: mildly increased wall thickness    EKG: sinus tachycardia, IVCD - QRSd 130 msec, T wave abnormality in the anterolateral leads    Diet:  2 g sodium diet  2000 mL fluid restriction    Neurohormonal Blockade:  --Beta-Blocker: metoprolol succinate 25 mg BID  --ACEi, ARB or ARNi: entresto 24-26mg BID  --Aldosterone Receptor Blocker: eplerenone 25mg once a day  --SGLT2 Inhibitor: Jardiance 10 mg daily  --Diuretic: furosemide 20mg daily as needed    Sudden Cardiac Death Risk Reduction:  --ICD: LifeVest     Electrical Resynchronization:  --Candidacy for BiV device: IVCD QRSd 130 msec    Advanced Therapies (if appropriate): --We will continue to monitor    # Mixed hyperlipidemia  22:  HDL 44   ASCVD risk: 3 2%  # Sinus tachycardia, likely maladaptive response  # Severe sleep apnea, on sleep study 23  # Hypertension, BP above goal today, reports being anxious    Today's Plan:  Increase metoprolol to 37 5mg two times a day  Stop potassium   Obtain BMP in 1 week  Home monitoring of blood pressure, call in 1 week for BP recordings, plan to uptitrate entresto if BP above goal  Continue LifeVest  Echocardiogram in April to reassess LVEF, patient deferring ICD placement at this time  2g sodium diet  2L fluid restriction  Daily weights  CPAP study and sleep medicine consultation as scheduled      HPI:   36year old male with no known significant PMH who was recently diagnosed with cardiomyopathy  He pulled a muscle at work and had pinged nerve  He had some other testing while being evaluated and found to have an abnormal EKG  He was also noticing shortness of breath laying down and PND at that time  He subsequently had an echocardiogram which showed dilated LV at 7 3cm and severely reduced ejection fraction at 15%  Last wellness check showed mild tachycardia in 2021  Otherwise no other cardiac or medical history  Blood pressures were normal  He used to work out doing 20 mins of cardio and 20 mins of lifting until he got busy  Recently noticing shortness of breath when running up the steps  Continues to have orthopnea and PND  No leg swelling  Some abdominal fullness     FH: father  at of "heart attack" at 64; unclear if with CAD; no other known heart disease or sudden death in family  SH: quit smoking 6 years ago, smoked 1/2 ppd x 17 years; no alcohol use; used marijuana during teenage years  No flu-like or viral syndrome  Got covid vaccines 5/2021 and 11/2021 11/22/22: Here for follow up  On last visit: Started entresto 24-26mg, metoprolol and furosemide  11/25/22 Na 139 K 4 1 creatinine 1 4  He is overall feeling better  He is able to sleep better, no orthopnea or leg swelling  Weight down 7 lbs  Tolerating medications  Scheduled for cardiac cath, cardiac MRI and sleep study  12/9/22: here for follow up  Increased metoprolol succinate 25mg two times a day and decreased furosemide to 20mg daily as needed for weight gain  Cardiac cath showed normal coronaries, LVEDP 18mmHg  12/6/22 Na 139 K 4 2 and creatinine 1 18  Walking up a flight of stairs no shortness of breath  No orthopnea or PND    1/25/23: Here for follow up  Overall doing well  Staying active, power walking for 20mins and does hand bike 20 mins  Sleep study showed severe sleep apnea, scheduled for CPAP study and sleep medicine consult  No shortness of breath, leg swelling, PND or orthopnea  Taking medications as prescribed  PRN lasix dose last week  Interval History:  3/16/23: Here for follow up  Repeat echo 3/2 showed LVEF 30-35%  Overall doing well  Exercises in moderation on the treadmill  No shortness of breath or chest tightness  No palpitations or lightheadedness  Adherent with medications  Home BP 110s/70s  HR 70s-80s    History reviewed  No pertinent past medical history  Review of Systems   Constitutional: Negative for chills, fatigue and fever  HENT: Negative for ear pain and sore throat  Eyes: Negative for pain and visual disturbance  Respiratory: Negative for cough and shortness of breath  Cardiovascular: Negative for chest pain, palpitations and leg swelling  Gastrointestinal: Negative for abdominal distention, abdominal pain, nausea and vomiting  Genitourinary: Negative for dysuria and hematuria  Musculoskeletal: Negative for arthralgias and back pain  Skin: Negative for color change and rash  Neurological: Negative for dizziness, seizures and syncope  All other systems reviewed and are negative  No Known Allergies    Current Outpatient Medications:   •  eplerenone (INSPRA) 25 mg tablet, TAKE 1 TABLET (25 MG TOTAL) BY MOUTH DAILY  , Disp: 90 tablet, Rfl: 2  •  furosemide (LASIX) 20 mg tablet, TAKE 1 TABLET BY MOUTH EVERY DAY AS NEEDED, Disp: 90 tablet, Rfl: 1  •  Jardiance 10 MG TABS tablet, TAKE 1 TABLET (10 MG TOTAL) BY MOUTH EVERY MORNING , Disp: 90 tablet, Rfl: 1  •  metoprolol succinate (TOPROL-XL) 25 mg 24 hr tablet, Take 1 5 tablets (37 5 mg total) by mouth 2 (two) times a day, Disp: 270 tablet, Rfl: 1  •  sacubitril-valsartan (Entresto) 24-26 MG TABS, Take 1 tablet by mouth 2 (two) times a day, Disp: 180 tablet, Rfl: 3    Social History     Socioeconomic History   • Marital status: /Civil Union     Spouse name: Not on file   • Number of children: Not on file   • Years of education: Not on file   • Highest education level: Not on file   Occupational History   • Not on file   Tobacco Use   • Smoking status: Former   • Smokeless tobacco: Never   Substance and Sexual Activity   • Alcohol use: Not Currently   • Drug use: Never   • Sexual activity: Not on file   Other Topics Concern   • Not on file   Social History Narrative   • Not on file     Social Determinants of Health     Financial Resource Strain: Not on file   Food Insecurity: Not on file   Transportation Needs: Not on file   Physical Activity: Not on file   Stress: Not on file   Social Connections: Not on file   Intimate Partner Violence: Not on file   Housing Stability: Not on file       History reviewed  No pertinent family history  Physical Exam:    Vitals: Blood pressure 140/80, pulse 94, height 6' 2" (1 88 m), weight 120 kg (264 lb), SpO2 99 %  , Body mass index is 33 9 kg/m² ,   Wt Readings from Last 3 Encounters:   03/16/23 120 kg (264 lb)   03/02/23 119 kg (263 lb)   01/25/23 119 kg (263 lb)       Physical Exam  Constitutional:       General: He is not in acute distress  Appearance: Normal appearance  HENT:      Head: Normocephalic and atraumatic  Mouth/Throat:      Mouth: Mucous membranes are moist    Eyes:      General: No scleral icterus  Extraocular Movements: Extraocular movements intact  Cardiovascular:      Rate and Rhythm: Normal rate and regular rhythm  Pulses: Normal pulses  Heart sounds: S1 normal and S2 normal  No murmur heard  No friction rub  No gallop  Comments: Nonelevated JVP  Pulmonary:      Breath sounds: Normal breath sounds  Abdominal:      General: There is no distension  Palpations: Abdomen is soft  Tenderness: There is no abdominal tenderness  There is no guarding or rebound  Musculoskeletal:         General: Normal range of motion  Cervical back: Neck supple  Right lower leg: No edema  Left lower leg: No edema  Skin:     General: Skin is warm and dry  Capillary Refill: Capillary refill takes less than 2 seconds  Neurological:      General: No focal deficit present  Mental Status: He is alert and oriented to person, place, and time     Psychiatric:         Mood and Affect: Mood normal          Labs & Results:    Lab Results   Component Value Date    WBC 8 08 11/25/2022    HGB 16 9 11/25/2022    HCT 52 5 (H) 11/25/2022    MCV 87 11/25/2022     11/25/2022     Lab Results   Component Value Date    SODIUM 136 02/14/2023    K 4 2 02/14/2023     02/14/2023    CO2 24 02/14/2023    BUN 17 02/14/2023    CREATININE 1 21 02/14/2023    GLUC 86 02/14/2023    CALCIUM 9 2 02/14/2023     No results found for: NTBNP   No results found for: CHOLESTEROL  No results found for: HDL  No results found for: TRIG  No results found for: [place]    EKG personally reviewed by Vicenta Hicks Suresh Allen MD      Counseling / Coordination of Care  Time spent today 25 minutes  Greater than 50% of total time was spent with the patient and / or family counseling and / or coordination of care  We discussed diagnoses, most recent studies and any changes in treatment    Thank you for the opportunity to participate in the care of this patient      Samina Hoang MD  ADVANCED HEART FAILURE AND MECHANICAL CIRCULATORY SUPPORT  [facility]

## 2023-03-26 ENCOUNTER — APPOINTMENT (OUTPATIENT)
Dept: LAB | Facility: CLINIC | Age: 43
End: 2023-03-26

## 2023-03-26 DIAGNOSIS — I50.20 HEART FAILURE WITH REDUCED EJECTION FRACTION (HCC): ICD-10-CM

## 2023-03-26 LAB
ANION GAP SERPL CALCULATED.3IONS-SCNC: 7 MMOL/L (ref 4–13)
BUN SERPL-MCNC: 15 MG/DL (ref 5–25)
CALCIUM SERPL-MCNC: 9.3 MG/DL (ref 8.4–10.2)
CHLORIDE SERPL-SCNC: 108 MMOL/L (ref 96–108)
CO2 SERPL-SCNC: 25 MMOL/L (ref 21–32)
CREAT SERPL-MCNC: 1.07 MG/DL (ref 0.6–1.3)
GFR SERPL CREATININE-BSD FRML MDRD: 83 ML/MIN/1.73SQ M
GLUCOSE P FAST SERPL-MCNC: 96 MG/DL (ref 65–99)
POTASSIUM SERPL-SCNC: 3.7 MMOL/L (ref 3.5–5.3)
SODIUM SERPL-SCNC: 140 MMOL/L (ref 135–147)

## 2023-03-27 ENCOUNTER — TELEPHONE (OUTPATIENT)
Dept: CARDIOLOGY CLINIC | Facility: CLINIC | Age: 43
End: 2023-03-27

## 2023-03-27 DIAGNOSIS — I50.20 HEART FAILURE WITH REDUCED EJECTION FRACTION (HCC): ICD-10-CM

## 2023-03-27 RX ORDER — SACUBITRIL AND VALSARTAN 49; 51 MG/1; MG/1
1 TABLET, FILM COATED ORAL 2 TIMES DAILY
Qty: 180 TABLET | Refills: 3 | Status: SHIPPED | OUTPATIENT
Start: 2023-03-27

## 2023-03-27 NOTE — TELEPHONE ENCOUNTER
MD Alexis Stein RN  Please let him know BMP normal  Please obtain BP log for possible uptitration of entresto   Thanks

## 2023-03-31 ENCOUNTER — HOSPITAL ENCOUNTER (OUTPATIENT)
Dept: SLEEP CENTER | Facility: CLINIC | Age: 43
Discharge: HOME | End: 2023-03-31

## 2023-03-31 DIAGNOSIS — G47.33 OSA (OBSTRUCTIVE SLEEP APNEA): ICD-10-CM

## 2023-04-01 NOTE — PROGRESS NOTES
Sleep Study Documentation    Pre-Sleep Study       Sleep testing procedure explained to patient:YES    Patient napped prior to study:NO    Caffeine:Dayshift worker after 12PM   Caffeine use:NO    Alcohol:Dayshift workers after 5PM: Alcohol use:NO    Typical day for patient:YES       Study Documentation    Sleep Study Indications: NIGHAT diagnosed in lab study    Sleep Study: Treatment   Optimal PAP pressure: +[id number]  Leak:None  Snore: Eliminated  REM Obtained:yes  Supplemental O2: no    Minimum SaO2 88%  Baseline SaO2 95%    PAP mask choice (final) Large ResMed IarTouch F20   PAP mask type:full face  PAP pressure at which snoring was eliminated +[id number]  Minimum SaO2 at final PAP pressure 95%  Mode of Therapy:BiPAP  ETCO2:No  CPAP changed to BiPAP:Yes  If yes why High CPAP pressure, severe respt  events    Mode of Therapy:BiPAP    EKG abnormalities: no     EEG abnormalities: no    Sleep Study Recorded < 2 hours: N/A    Sleep Study Recorded > 2 hours but incomplete study: N/A    Sleep Study Recorded 6 hours but no sleep obtained: NO    Patient classification: employed       Post-Sleep Study    Medication used at bedtime or during sleep study:YES other prescription medications    Patient reports time it took to fall asleep:less than 20 minutes    Patient reports waking up during study:1 to 2 times  Patient reports returning to sleep without difficulty  Patient reports sleeping 4 to 6 hours without dreaming  Patient reports sleep during study:better than usual    Patient rated sleepiness: Not sleepy or tired    PAP treatment:yes: Post PAP treatment patient reports feeling better and  would wear PAP mask at home

## 2023-04-05 ENCOUNTER — HOSPITAL ENCOUNTER (OUTPATIENT)
Dept: NON INVASIVE DIAGNOSTICS | Facility: CLINIC | Age: 43
Discharge: HOME | End: 2023-04-05

## 2023-04-05 ENCOUNTER — TELEPHONE (OUTPATIENT)
Dept: CARDIOLOGY CLINIC | Facility: CLINIC | Age: 43
End: 2023-04-05

## 2023-04-05 VITALS
HEIGHT: 74 IN | DIASTOLIC BLOOD PRESSURE: 80 MMHG | HEART RATE: 66 BPM | SYSTOLIC BLOOD PRESSURE: 140 MMHG | WEIGHT: 264 LBS | BODY MASS INDEX: 33.88 KG/M2

## 2023-04-05 DIAGNOSIS — I50.20 HEART FAILURE WITH REDUCED EJECTION FRACTION (HCC): ICD-10-CM

## 2023-04-05 LAB
APICAL FOUR CHAMBER EJECTION FRACTION: 44 %
E WAVE DECELERATION TIME: 203 MS
LEFT VENTRICLE DIASTOLIC VOLUME (MOD BIPLANE): 229 ML
LEFT VENTRICLE SYSTOLIC VOLUME (MOD BIPLANE): 133 ML
LEFT VENTRICULAR INTERNAL DIMENSION IN DIASTOLE: 6.7 CM (ref 3.5–6)
LV EF: 42 %
MV E'TISSUE VEL-SEP: 9 CM/S
MV PEAK A VEL: 0.48 M/S
MV PEAK E VEL: 60 CM/S
MV STENOSIS PRESSURE HALF TIME: 59 MS
MV VALVE AREA P 1/2 METHOD: 3.73
RA PRESSURE ESTIMATED: 8 MMHG
SL CV LV EF: 45

## 2023-04-05 NOTE — TELEPHONE ENCOUNTER
----- Message from Chun Lowry sent at 4/4/2023  8:59 AM EDT -----    ----- Message -----  From: Kim Gomez MD  Sent: 4/4/2023   8:24 AM EDT  To: Cardiology [facility]    Please advise to re-schedule appointment with sleep medicine for consultation

## 2023-04-27 ENCOUNTER — TELEPHONE (OUTPATIENT)
Dept: SLEEP CENTER | Facility: CLINIC | Age: 43
End: 2023-04-27

## 2023-04-27 NOTE — TELEPHONE ENCOUNTER
Spoke to the patient  He wants to be seen in the [place] office   Scheduled his appointment for 10/25/2023   Patient declined earlier appointment for [facility]

## 2023-05-22 ENCOUNTER — TELEPHONE (OUTPATIENT)
Dept: CARDIOLOGY CLINIC | Facility: CLINIC | Age: 43
End: 2023-05-22

## 2023-05-22 DIAGNOSIS — I50.20 HEART FAILURE WITH REDUCED EJECTION FRACTION (HCC): ICD-10-CM

## 2023-05-22 RX ORDER — SACUBITRIL AND VALSARTAN 49; 51 MG/1; MG/1
1 TABLET, FILM COATED ORAL 2 TIMES DAILY
Qty: 180 TABLET | Refills: 3 | Status: SHIPPED | OUTPATIENT
Start: 2023-05-22 | End: 2023-05-22 | Stop reason: SDUPTHER

## 2023-05-22 NOTE — TELEPHONE ENCOUNTER
Patient states he has been incorrectly taking his entresto  he was taking 49/51 mg tablets at 2 tablets twice a day  Instead of increasing his dose from 24/26 mg to 49/51 mg  He can not  at the pharmacy until 6/15/2023 but we have samples to bridge him  Can patient just decrease down to the 49/51 mg dose ?

## 2023-05-22 NOTE — TELEPHONE ENCOUNTER
Spoke to patient, advised him to come in for nurse visit to assess vitals    If vitals are stable the Doctor may keep him on the high dose of entresto

## 2023-05-23 ENCOUNTER — CLINICAL SUPPORT (OUTPATIENT)
Dept: CARDIOLOGY CLINIC | Facility: CLINIC | Age: 43
End: 2023-05-23

## 2023-05-23 VITALS
DIASTOLIC BLOOD PRESSURE: 86 MMHG | OXYGEN SATURATION: 99 % | HEIGHT: 74 IN | SYSTOLIC BLOOD PRESSURE: 130 MMHG | HEART RATE: 80 BPM | BODY MASS INDEX: 34.01 KG/M2 | WEIGHT: 265 LBS

## 2023-05-23 DIAGNOSIS — I50.20 HEART FAILURE WITH REDUCED EJECTION FRACTION (HCC): ICD-10-CM

## 2023-05-23 DIAGNOSIS — I10 PRIMARY HYPERTENSION: Primary | ICD-10-CM

## 2023-05-23 RX ORDER — SACUBITRIL AND VALSARTAN 97; 103 MG/1; MG/1
1 TABLET, FILM COATED ORAL 2 TIMES DAILY
Qty: 180 TABLET | Refills: 3 | Status: SHIPPED | OUTPATIENT
Start: 2023-05-23

## 2023-05-23 RX ORDER — SACUBITRIL AND VALSARTAN 49; 51 MG/1; MG/1
2 TABLET, FILM COATED ORAL 2 TIMES DAILY
Qty: 112 TABLET | Refills: 0 | Status: SHIPPED | COMMUNITY
Start: 2023-05-23 | End: 2023-05-23 | Stop reason: DRUGHIGH

## 2023-05-23 NOTE — PROGRESS NOTES
Pt is here today under the direction of Dr Ambrocio Yang for a BP check  Reviewed list of meds with pt  Pt is currently taking Entresto 49/51 mg 2 tablets twice daily  BP in office 130/86 hr 80    Vitals reviewed by Dr Azam Tellez  Per Dr Mares Client recommendations pt should stay on higher dose Entresto 07/103 mg 1 tablet twice daily  Provided pt with the Entresto 49/51 mg samples to continue to take 2 tablets twice daily  Will forward information to Dr Ambrocio Yang for her review  please advised if should proceed with ordering higher dose

## 2023-06-28 ENCOUNTER — OFFICE VISIT (OUTPATIENT)
Dept: CARDIOLOGY CLINIC | Facility: CLINIC | Age: 43
End: 2023-06-28
Payer: COMMERCIAL

## 2023-06-28 VITALS
OXYGEN SATURATION: 100 % | WEIGHT: 261.8 LBS | SYSTOLIC BLOOD PRESSURE: 130 MMHG | DIASTOLIC BLOOD PRESSURE: 82 MMHG | HEART RATE: 82 BPM | HEIGHT: 74 IN | BODY MASS INDEX: 33.6 KG/M2

## 2023-06-28 DIAGNOSIS — I42.8 NONISCHEMIC CARDIOMYOPATHY (HCC): ICD-10-CM

## 2023-06-28 DIAGNOSIS — I50.20 HEART FAILURE WITH REDUCED EJECTION FRACTION (HCC): Primary | ICD-10-CM

## 2023-06-28 DIAGNOSIS — G47.33 OSA (OBSTRUCTIVE SLEEP APNEA): ICD-10-CM

## 2023-06-28 DIAGNOSIS — E78.2 MIXED HYPERLIPIDEMIA: ICD-10-CM

## 2023-06-28 DIAGNOSIS — I10 PRIMARY HYPERTENSION: ICD-10-CM

## 2023-06-28 PROCEDURE — 99214 OFFICE O/P EST MOD 30 MIN: CPT | Performed by: INTERNAL MEDICINE

## 2023-06-28 RX ORDER — METOPROLOL SUCCINATE 25 MG/1
50 TABLET, EXTENDED RELEASE ORAL 2 TIMES DAILY
Qty: 360 TABLET | Refills: 2 | Status: SHIPPED | OUTPATIENT
Start: 2023-06-28 | End: 2024-03-24

## 2023-06-28 NOTE — PROGRESS NOTES
Advanced Heart Failure Outpatient Progress Note - Jasmeet Crowder 39 y o  male MRN: [id number]    Encounter: [id number]      Assessment/Plan:    Patient Active Problem List    Diagnosis Date Noted   • Central sleep apnea with Cheyne-Moore respiration    • NIGHAT (obstructive sleep apnea)    • Dilated cardiomyopathy ([facility] [address] ) 12/06/2022   • Heart failure with reduced ejection fraction ([facility] [address] ) 11/22/2022   • Mixed hyperlipidemia 11/22/2022   • Suspected sleep apnea 11/22/2022   • Elevated BP without diagnosis of hypertension 11/22/2022       # Chronic heart failure reduced ejection fraction, improved to 45%, Stage C, NYHA I/II  Etiology: Nonischemic  Suspect chronic given dilated LV  Unclear etiology  Reports father had a heart attack/sudden death at 65 y/o  No history of heavy alcohol or drug use  No supplements or steroid use  Tachycardic but sinus rhythm with no conduction abnormality on EKG  TSH normal  No inflammation, infiltrative disease or scarring on cardiac MRI  Euvolemic, warm on exam    Weight: 284 lbs 11/21/22; 277 lbs 11/30/22; 271 lbs  12/12/22; 263 lbs 1/25/23; 264 lbs 3/16/23  BNP: 349 11/4/22    Studies- personally reviewed by me    Serology: TSH normal, iron panel normal    Echo 4/5/23:   LVEF 45%  LVIDD 6 7cm  Normal RV systolic function    Echo 3/2/23:  LVEF 30-35%  LVIDD 6 9 cm  Normal RV size and systolic function    Cardiac MRI 12/27/22: LVEF 17%  LVIDD 7 8 cm  Top normal RV size with mildly reduced systolic function   No evidence of myocardial inflammation, infiltrative disease or scarring      Cardiac cath 12/6/22: normal coronaries, LVEDP 18mmHg    Echocardiogram 11/17/22  LVEF: 15%  LVIDd: 7 3cm  RV: mildly dilated, mildly reduced systolic function  MR: trace  PASP: inadequate TR envelope for estimation  RVOT: no notching  Other: mildly increased wall thickness    EKG: sinus tachycardia, IVCD - QRSd 130 msec, T wave abnormality in the anterolateral leads    Diet:  2 g sodium diet  2000 mL "fluid restriction    Neurohormonal Blockade:  --Beta-Blocker: metoprolol succinate 37 5 mg BID  --ACEi, ARB or ARNi: entresto  mg BID  --Aldosterone Receptor Blocker: eplerenone 25mg once a day  --SGLT2 Inhibitor: Jardiance 10 mg daily  --Diuretic: furosemide 20mg daily as needed    Sudden Cardiac Death Risk Reduction:  --ICD: LVEF improved to >35%    Electrical Resynchronization:  --Candidacy for BiV device: IVCD QRSd 130 msec    Advanced Therapies (if appropriate): --We will continue to monitor    # Mixed hyperlipidemia  22:  HDL 44   ASCVD risk: 3 2%  # Sinus tachycardia, likely maladaptive response  # Severe sleep apnea, on sleep study 23, awaiting sleep medicine consultation and BIPAP  # Hypertension, controlled    Today's Plan:  Increase metoprolol to 50mg two times a day  Continue rest of cardiac medications  2g sodium diet  2L fluid restriction  Daily weights  Physical activities/exercise as tolerated  Sleep medicine consultation as scheduled      HPI:   36year old male with no known significant PMH who was recently diagnosed with cardiomyopathy  He pulled a muscle at work and had pinged nerve  He had some other testing while being evaluated and found to have an abnormal EKG  He was also noticing shortness of breath laying down and PND at that time  He subsequently had an echocardiogram which showed dilated LV at 7 3cm and severely reduced ejection fraction at 15%  Last wellness check showed mild tachycardia in 2021  Otherwise no other cardiac or medical history  Blood pressures were normal  He used to work out doing 20 mins of cardio and 20 mins of lifting until he got busy  Recently noticing shortness of breath when running up the steps  Continues to have orthopnea and PND  No leg swelling  Some abdominal fullness     FH: father  at of \"heart attack\" at 64; unclear if with CAD; no other known heart disease or sudden death in family  SH: quit smoking 6 years ago, " smoked 1/2 ppd x 17 years; no alcohol use; used marijuana during teenage years  No flu-like or viral syndrome  Got covid vaccines 5/2021 and 11/2021 11/22/22: Here for follow up  On last visit: Started entresto 24-26mg, metoprolol and furosemide  11/25/22 Na 139 K 4 1 creatinine 1 4  He is overall feeling better  He is able to sleep better, no orthopnea or leg swelling  Weight down 7 lbs  Tolerating medications  Scheduled for cardiac cath, cardiac MRI and sleep study  12/9/22: here for follow up  Increased metoprolol succinate 25mg two times a day and decreased furosemide to 20mg daily as needed for weight gain  Cardiac cath showed normal coronaries, LVEDP 18mmHg  12/6/22 Na 139 K 4 2 and creatinine 1 18  Walking up a flight of stairs no shortness of breath  No orthopnea or PND    1/25/23: Here for follow up  Overall doing well  Staying active, power walking for 20mins and does hand bike 20 mins  Sleep study showed severe sleep apnea, scheduled for CPAP study and sleep medicine consult  No shortness of breath, leg swelling, PND or orthopnea  Taking medications as prescribed  PRN lasix dose last week  3/16/23: Here for follow up  Repeat echo 3/2 showed LVEF 30-35%  Overall doing well  Exercises in moderation on the treadmill  No shortness of breath or chest tightness  No palpitations or lightheadedness  Adherent with medications  Home BP 110s/70s  HR 70s-80s    Interval History:  6/28/23: Here for follow up  Overall doing well  Repeat echo 4/5/23 showed LVEF of 45%  Vicenta Covarrubias returned  Tolerating increased dose of metoprolol on last visit  Denies shortness of breath or chest pain  No palpitations or lightheadedness  Adherent with medications  No past medical history on file  Review of Systems   Constitutional: Negative for chills, fatigue and fever  HENT: Negative for ear pain and sore throat  Eyes: Negative for pain and visual disturbance     Respiratory: Negative for cough and shortness of breath  Cardiovascular: Negative for chest pain, palpitations and leg swelling  Gastrointestinal: Negative for abdominal distention, abdominal pain, nausea and vomiting  Genitourinary: Negative for dysuria and hematuria  Musculoskeletal: Negative for arthralgias and back pain  Skin: Negative for color change and rash  Neurological: Negative for dizziness, seizures and syncope  All other systems reviewed and are negative  No Known Allergies    Current Outpatient Medications:   •  eplerenone (INSPRA) 25 mg tablet, TAKE 1 TABLET (25 MG TOTAL) BY MOUTH DAILY  , Disp: 90 tablet, Rfl: 2  •  furosemide (LASIX) 20 mg tablet, TAKE 1 TABLET BY MOUTH EVERY DAY AS NEEDED, Disp: 90 tablet, Rfl: 1  •  Jardiance 10 MG TABS tablet, TAKE 1 TABLET (10 MG TOTAL) BY MOUTH EVERY MORNING , Disp: 90 tablet, Rfl: 1  •  metoprolol succinate (TOPROL-XL) 25 mg 24 hr tablet, Take 1 5 tablets (37 5 mg total) by mouth 2 (two) times a day, Disp: 270 tablet, Rfl: 1  •  sacubitril-valsartan (Entresto)  MG TABS, Take 1 tablet by mouth 2 (two) times a day, Disp: 180 tablet, Rfl: 3    Social History     Socioeconomic History   • Marital status: /Civil Union     Spouse name: Not on file   • Number of children: Not on file   • Years of education: Not on file   • Highest education level: Not on file   Occupational History   • Not on file   Tobacco Use   • Smoking status: Former   • Smokeless tobacco: Never   Substance and Sexual Activity   • Alcohol use: Not Currently   • Drug use: Never   • Sexual activity: Not on file   Other Topics Concern   • Not on file   Social History Narrative   • Not on file     Social Determinants of Health     Financial Resource Strain: Not on file   Food Insecurity: Not on file   Transportation Needs: Not on file   Physical Activity: Not on file   Stress: Not on file   Social Connections: Not on file   Intimate Partner Violence: Not on file   Housing Stability: Not on file       No family "history on file  Physical Exam:    Vitals: Blood pressure 130/82, pulse 82, height 6' 2\" (1 88 m), weight 119 kg (261 lb 12 8 oz), SpO2 100 %  , Body mass index is 33 61 kg/m² ,   Wt Readings from Last 3 Encounters:   06/28/23 119 kg (261 lb 12 8 oz)   05/23/23 120 kg (265 lb)   04/05/23 120 kg (264 lb)       Physical Exam  Constitutional:       General: He is not in acute distress  Appearance: Normal appearance  HENT:      Head: Normocephalic and atraumatic  Mouth/Throat:      Mouth: Mucous membranes are moist    Eyes:      General: No scleral icterus  Extraocular Movements: Extraocular movements intact  Cardiovascular:      Rate and Rhythm: Normal rate and regular rhythm  Pulses: Normal pulses  Heart sounds: S1 normal and S2 normal  No murmur heard  No friction rub  No gallop  Comments: Nonelevated JVP  Pulmonary:      Breath sounds: Normal breath sounds  Abdominal:      General: There is no distension  Palpations: Abdomen is soft  Tenderness: There is no abdominal tenderness  There is no guarding or rebound  Musculoskeletal:         General: Normal range of motion  Cervical back: Neck supple  Right lower leg: No edema  Left lower leg: No edema  Skin:     General: Skin is warm and dry  Capillary Refill: Capillary refill takes less than 2 seconds  Neurological:      General: No focal deficit present  Mental Status: He is alert and oriented to person, place, and time     Psychiatric:         Mood and Affect: Mood normal          Labs & Results:    Lab Results   Component Value Date    WBC 8 08 11/25/2022    HGB 16 9 11/25/2022    HCT 52 5 (H) 11/25/2022    MCV 87 11/25/2022     11/25/2022     Lab Results   Component Value Date    SODIUM 140 03/26/2023    K 3 7 03/26/2023     03/26/2023    CO2 25 03/26/2023    BUN 15 03/26/2023    CREATININE 1 07 03/26/2023    GLUC 86 02/14/2023    CALCIUM 9 3 03/26/2023     No results found " "for: \"NTBNP\"   No results found for: \"CHOLESTEROL\"  No results found for: \"HDL\"  No results found for: \"TRIG\"  No results found for: \"NONHDLC\"    EKG personally reviewed by Cierra Yang MD      Counseling / Coordination of Care  Time spent today 25 minutes  Greater than 50% of total time was spent with the patient and / or family counseling and / or coordination of care  We discussed diagnoses, most recent studies and any changes in treatment    Thank you for the opportunity to participate in the care of this patient      Alexandra Franks MD  ADVANCED HEART FAILURE AND MECHANICAL CIRCULATORY SUPPORT  [facility]"

## 2023-06-28 NOTE — PATIENT INSTRUCTIONS
Increase metoprolol succinate to 50mg two times a day  Continue rest of cardiac medications  2g sodium diet  2L fluid restriction  Daily weights  Physical activities/exercise as tolerated

## 2023-07-19 ENCOUNTER — HOSPITAL ENCOUNTER (OUTPATIENT)
Dept: ULTRASOUND IMAGING | Facility: HOSPITAL | Age: 43
Discharge: HOME | End: 2023-07-19
Attending: FAMILY MEDICINE
Payer: COMMERCIAL

## 2023-07-19 DIAGNOSIS — R10.9 RIGHT FLANK PAIN: ICD-10-CM

## 2023-07-19 DIAGNOSIS — I50.20 HEART FAILURE WITH REDUCED EJECTION FRACTION (HCC): ICD-10-CM

## 2023-07-19 PROCEDURE — 76775 US EXAM ABDO BACK WALL LIM: CPT

## 2023-07-19 RX ORDER — METOPROLOL SUCCINATE 50 MG/1
50 TABLET, EXTENDED RELEASE ORAL 2 TIMES DAILY
Qty: 180 TABLET | Refills: 2 | Status: SHIPPED | OUTPATIENT
Start: 2023-07-19 | End: 2024-07-18

## 2023-08-05 DIAGNOSIS — I50.20 HEART FAILURE WITH REDUCED EJECTION FRACTION (HCC): ICD-10-CM

## 2023-08-07 RX ORDER — EMPAGLIFLOZIN 10 MG/1
TABLET, FILM COATED ORAL
Qty: 90 TABLET | Refills: 1 | Status: SHIPPED | OUTPATIENT
Start: 2023-08-07

## 2023-08-17 DIAGNOSIS — I50.20 HEART FAILURE WITH REDUCED EJECTION FRACTION (HCC): ICD-10-CM

## 2023-08-17 RX ORDER — FUROSEMIDE 20 MG/1
TABLET ORAL
Qty: 90 TABLET | Refills: 1 | Status: SHIPPED | OUTPATIENT
Start: 2023-08-17

## 2023-09-12 NOTE — TELEPHONE ENCOUNTER
Forms Request  Name of form/paperwork: [employer]  Have you been seen for this request:   Do we have the form: ON DR ZAFAR'S DESK  When is form needed by: WHEN DONE  How would you like the form returned: [phone]   Patient Notified form requests are processed in 3-5 business days: YES    Okay to leave a detailed message?         Ordered 2 wk zio

## 2023-09-20 ENCOUNTER — RA CDI HCC (OUTPATIENT)
Dept: OTHER | Facility: HOSPITAL | Age: 43
End: 2023-09-20

## 2023-09-20 NOTE — PROGRESS NOTES
[facility] coding opportunities       Chart reviewed, no opportunity found: CHART REVIEWED, NO OPPORTUNITY FOUND        Patients Insurance        Commercial Insurance: Harvey Gaines

## 2023-09-25 NOTE — PROGRESS NOTES
Advanced Heart Failure Outpatient Progress Note - Isaiah Decker 39 y.o. male MRN: [id number]    Encounter: [id number]      Assessment/Plan:    Patient Active Problem List    Diagnosis Date Noted   • Central sleep apnea with Cheyne-Moore respiration    • NIGHAT (obstructive sleep apnea)    • Dilated cardiomyopathy ([address]) 12/06/2022   • Heart failure with reduced ejection fraction ([address]) 11/22/2022   • Mixed hyperlipidemia 11/22/2022   • Suspected sleep apnea 11/22/2022   • Elevated BP without diagnosis of hypertension 11/22/2022       # Chronic heart failure reduced ejection fraction, improved to 45%, Stage C, NYHA I/II  Etiology: Nonischemic. Suspect chronic given dilated LV. Unclear etiology. Reports father had a heart attack/sudden death at 63 y/o. No history of heavy alcohol or drug use. No supplements or steroid use. Tachycardic but sinus rhythm with no conduction abnormality on EKG. TSH normal. No inflammation, infiltrative disease or scarring on cardiac MRI. Euvolemic     Weight: 284 lbs 11/21/22; 277 lbs 11/30/22; 271 lbs  12/12/22; 263 lbs 1/25/23; 264 lbs 3/16/23; 256 lbs 9/26/23  BNP: 349 11/4/22    Studies- personally reviewed by me    Serology: TSH normal, iron panel normal    Echo 4/5/23:   LVEF 45%  LVIDD 6.7cm  Normal RV systolic function    Echo 3/2/23:  LVEF 30-35%  LVIDD 6.9 cm  Normal RV size and systolic function    Cardiac MRI 12/27/22: LVEF 17%. LVIDD 7.8 cm. Top normal RV size with mildly reduced systolic function.  No evidence of myocardial inflammation, infiltrative disease or scarring.     Cardiac cath 12/6/22: normal coronaries, LVEDP 18mmHg    Echocardiogram 11/17/22  LVEF: 15%  LVIDd: 7.3cm  RV: mildly dilated, mildly reduced systolic function  MR: trace  PASP: inadequate TR envelope for estimation  RVOT: no notching  Other: mildly increased wall thickness    EKG: sinus tachycardia, IVCD - QRSd 130 msec, T wave abnormality in the anterolateral leads    Diet:  2 g sodium diet  2000 mL fluid restriction    Neurohormonal Blockade:  --Beta-Blocker: metoprolol succinate 50 mg BID  --ACEi, ARB or ARNi: entresto  mg BID  --Aldosterone Receptor Blocker: eplerenone 25mg once a day  --SGLT2 Inhibitor: Jardiance 10 mg daily  --Diuretic: furosemide 20mg daily as needed    Sudden Cardiac Death Risk Reduction:  --ICD: LVEF improved to >35%    Electrical Resynchronization:  --Candidacy for BiV device: IVCD QRSd 130 msec    Advanced Therapies (if appropriate): --We will continue to monitor    # Mixed hyperlipidemia  22:  HDL 44   ASCVD risk: 3.2%  # Sinus tachycardia, likely maladaptive response  # Severe sleep apnea, on sleep study 23, awaiting sleep medicine consultation and BIPAP  # Hypertension, controlled    Today's Plan:  Continue current medications  Monitor blood pressure and pulse rate at home. Call next week for recordings, with plan to increase metoprolol if [place] mostly >80bpm  2g sodium diet  2L fluid restriction  Daily weights  Physical activities/exercise as tolerated  Sleep medicine consultation as scheduled      HPI:   39 y.o.  male with no known significant PMH who was diagnosed with cardiomyopathy 2022. He pulled a muscle at work and had pinged nerve. He had some other testing while being evaluated and found to have an abnormal EKG. He was also noticing shortness of breath laying down and PND at that time. He subsequently had an echocardiogram which showed dilated LV at 7.3cm and severely reduced ejection fraction at 15%. Last wellness check showed mild tachycardia in 2021. Otherwise no other cardiac or medical history. Blood pressures were normal. He used to work out doing 20 mins of cardio and 20 mins of lifting until he got busy. Recently noticing shortness of breath when running up the steps. Continues to have orthopnea and PND. No leg swelling. Some abdominal fullness.    FH: father  at of "heart attack" at 64; unclear if with CAD; no other known heart disease or sudden death in family  SH: quit smoking 6 years ago, smoked 1/2 ppd x 17 years; no alcohol use; used marijuana during teenage years. No flu-like or viral syndrome. Got covid vaccines 5/2021 and 11/2021 11/22/22: Here for follow up. On last visit: Started entresto 24-26mg, metoprolol and furosemide. 11/25/22 Na 139 K 4.1 creatinine 1.4. He is overall feeling better. He is able to sleep better, no orthopnea or leg swelling. Weight down 7 lbs. Tolerating medications. Scheduled for cardiac cath, cardiac MRI and sleep study. 12/9/22: here for follow up. Increased metoprolol succinate 25mg two times a day and decreased furosemide to 20mg daily as needed for weight gain. Cardiac cath showed normal coronaries, LVEDP 18mmHg. 12/6/22 Na 139 K 4.2 and creatinine 1.18. Walking up a flight of stairs no shortness of breath. No orthopnea or PND    1/25/23: Here for follow up. Overall doing well. Staying active, power walking for 20mins and does hand bike 20 mins. Sleep study showed severe sleep apnea, scheduled for CPAP study and sleep medicine consult. No shortness of breath, leg swelling, PND or orthopnea. Taking medications as prescribed. PRN lasix dose last week. 3/16/23: Here for follow up. Repeat echo 3/2 showed LVEF 30-35%. Overall doing well. Exercises in moderation on the treadmill. No shortness of breath or chest tightness. No palpitations or lightheadedness. Adherent with medications. Home BP 110s/70s. HR 70s-80s    6/28/23: Here for follow up. Overall doing well. Repeat echo 4/5/23 showed LVEF of 45%. Rayfield Herb returned. Tolerating increased dose of metoprolol on last visit. Denies shortness of breath or chest pain. No palpitations or lightheadedness. Adherent with medications. Interval History:  9/25/23: here for follow up. Increased metoprolol to 50mg BID on last visit. Overall feeling well. Staying active doing power walks. Eating healthier, lost 5 lbs since last visit.  No shortness of breath, leg swelling, PND or orthopnea. Pulse rate elevated on office visit today. History reviewed. No pertinent past medical history. Review of Systems   Constitutional: Negative for chills, fatigue and fever. HENT: Negative for ear pain and sore throat. Eyes: Negative for pain and visual disturbance. Respiratory: Negative for cough and shortness of breath. Cardiovascular: Negative for chest pain, palpitations and leg swelling. Gastrointestinal: Negative for abdominal distention, abdominal pain, nausea and vomiting. Genitourinary: Negative for dysuria and hematuria. Musculoskeletal: Negative for arthralgias and back pain. Skin: Negative for color change and rash. Neurological: Negative for dizziness, seizures and syncope. All other systems reviewed and are negative. No Known Allergies  . Current Outpatient Medications:   •  eplerenone (INSPRA) 25 mg tablet, TAKE 1 TABLET (25 MG TOTAL) BY MOUTH DAILY. , Disp: 90 tablet, Rfl: 2  •  furosemide (LASIX) 20 mg tablet, TAKE 1 TABLET BY MOUTH EVERY DAY AS NEEDED, Disp: 90 tablet, Rfl: 1  •  Jardiance 10 MG TABS tablet, TAKE 1 TABLET (10 MG TOTAL) BY MOUTH EVERY MORNING., Disp: 90 tablet, Rfl: 1  •  metoprolol succinate (TOPROL-XL) 50 mg 24 hr tablet, Take 1 tablet (50 mg total) by mouth 2 (two) times a day, Disp: 180 tablet, Rfl: 2  •  sacubitril-valsartan (Entresto)  MG TABS, Take 1 tablet by mouth 2 (two) times a day, Disp: 180 tablet, Rfl: 3    Social History     Socioeconomic History   • Marital status: /Civil Union     Spouse name: Not on file   • Number of children: Not on file   • Years of education: Not on file   • Highest education level: Not on file   Occupational History   • Not on file   Tobacco Use   • Smoking status: Former   • Smokeless tobacco: Never   Substance and Sexual Activity   • Alcohol use: Not Currently   • Drug use: Never   • Sexual activity: Not on file   Other Topics Concern   • Not on file Social History Narrative   • Not on file     Social Determinants of Health     Financial Resource Strain: Not on file   Food Insecurity: Not on file   Transportation Needs: Not on file   Physical Activity: Not on file   Stress: Not on file   Social Connections: Not on file   Intimate Partner Violence: Not on file   Housing Stability: Not on file       History reviewed. No pertinent family history. Physical Exam:    Vitals: Blood pressure 122/86, pulse 90, height 6' 2" (1.88 m), weight 117 kg (256 lb 14.4 oz), SpO2 99 %. , Body mass index is 32.98 kg/m².,   Wt Readings from Last 3 Encounters:   09/26/23 117 kg (256 lb 14.4 oz)   06/28/23 119 kg (261 lb 12.8 oz)   05/23/23 120 kg (265 lb)       Physical Exam  Constitutional:       General: He is not in acute distress. Appearance: Normal appearance. HENT:      Head: Normocephalic and atraumatic. Mouth/Throat:      Mouth: Mucous membranes are moist.   Eyes:      General: No scleral icterus. Extraocular Movements: Extraocular movements intact. Cardiovascular:      Rate and Rhythm: Regular rhythm. Tachycardia present. Pulses: Normal pulses. Heart sounds: S1 normal and S2 normal. No murmur heard. No friction rub. No gallop. Comments: Nonelevated JVP  Pulmonary:      Breath sounds: Normal breath sounds. Abdominal:      General: There is no distension. Palpations: Abdomen is soft. Tenderness: There is no abdominal tenderness. There is no guarding or rebound. Musculoskeletal:         General: Normal range of motion. Cervical back: Neck supple. Right lower leg: No edema. Left lower leg: No edema. Skin:     General: Skin is warm and dry. Capillary Refill: Capillary refill takes less than 2 seconds. Neurological:      General: No focal deficit present. Mental Status: He is alert and oriented to person, place, and time.    Psychiatric:         Mood and Affect: Mood normal.         Labs & Results:    Lab Results   Component Value Date    WBC 8.08 11/25/2022    HGB 16.9 11/25/2022    HCT 52.5 (H) 11/25/2022    MCV 87 11/25/2022     11/25/2022     Lab Results   Component Value Date    SODIUM 140 03/26/2023    K 3.7 03/26/2023     03/26/2023    CO2 25 03/26/2023    BUN 15 03/26/2023    CREATININE 1.07 03/26/2023    GLUC 86 02/14/2023    CALCIUM 9.3 03/26/2023     No results found for: "NTBNP"   No results found for: "CHOLESTEROL"  No results found for: "HDL"  No results found for: "TRIG"  No results found for: "[address]"    EKG personally reviewed by Norma Cintron MD.     Counseling / Coordination of Care  Time spent today 25 minutes. Greater than 50% of total time was spent with the patient and / or family counseling and / or coordination of care. We discussed diagnoses, most recent studies and any changes in treatment    Thank you for the opportunity to participate in the care of this patient.     Norma Cintron MD  ADVANCED HEART FAILURE AND MECHANICAL CIRCULATORY SUPPORT  [address]

## 2023-09-26 ENCOUNTER — OFFICE VISIT (OUTPATIENT)
Dept: CARDIOLOGY CLINIC | Facility: CLINIC | Age: 43
End: 2023-09-26
Payer: COMMERCIAL

## 2023-09-26 VITALS
SYSTOLIC BLOOD PRESSURE: 122 MMHG | HEIGHT: 74 IN | BODY MASS INDEX: 32.97 KG/M2 | DIASTOLIC BLOOD PRESSURE: 86 MMHG | WEIGHT: 256.9 LBS | OXYGEN SATURATION: 99 % | HEART RATE: 90 BPM

## 2023-09-26 DIAGNOSIS — I50.20 HEART FAILURE WITH REDUCED EJECTION FRACTION (HCC): Primary | ICD-10-CM

## 2023-09-26 DIAGNOSIS — I10 PRIMARY HYPERTENSION: ICD-10-CM

## 2023-09-26 DIAGNOSIS — G47.33 OSA (OBSTRUCTIVE SLEEP APNEA): ICD-10-CM

## 2023-09-26 DIAGNOSIS — I42.8 NONISCHEMIC CARDIOMYOPATHY (HCC): ICD-10-CM

## 2023-09-26 PROCEDURE — 99214 OFFICE O/P EST MOD 30 MIN: CPT | Performed by: INTERNAL MEDICINE

## 2023-09-26 PROCEDURE — 93000 ELECTROCARDIOGRAM COMPLETE: CPT | Performed by: INTERNAL MEDICINE

## 2023-09-26 RX ORDER — METOPROLOL SUCCINATE 50 MG/1
75 TABLET, EXTENDED RELEASE ORAL 2 TIMES DAILY
Qty: 270 TABLET | Refills: 3 | Status: CANCELLED | OUTPATIENT
Start: 2023-09-26 | End: 2024-09-25

## 2023-09-26 NOTE — PATIENT INSTRUCTIONS
Continue current medications  Monitor blood pressure and pulse rate at home.   Call next week for recordings, with plan to increase metoprolol if [place] mostly >80bpm  2g sodium diet  2L fluid restriction  Daily weights  Physical activities/exercise as tolerated  Sleep medicine consultation as scheduled

## 2023-11-13 DIAGNOSIS — I50.20 HEART FAILURE WITH REDUCED EJECTION FRACTION (HCC): ICD-10-CM

## 2023-11-13 RX ORDER — EPLERENONE 25 MG/1
25 TABLET, FILM COATED ORAL DAILY
Qty: 90 TABLET | Refills: 2 | Status: SHIPPED | OUTPATIENT
Start: 2023-11-13

## 2023-11-28 ENCOUNTER — OFFICE VISIT (OUTPATIENT)
Dept: SLEEP CENTER | Facility: CLINIC | Age: 43
End: 2023-11-28
Payer: COMMERCIAL

## 2023-11-28 ENCOUNTER — TELEPHONE (OUTPATIENT)
Dept: SLEEP CENTER | Facility: CLINIC | Age: 43
End: 2023-11-28

## 2023-11-28 VITALS
HEIGHT: 74 IN | SYSTOLIC BLOOD PRESSURE: 140 MMHG | WEIGHT: 269 LBS | OXYGEN SATURATION: 99 % | DIASTOLIC BLOOD PRESSURE: 88 MMHG | BODY MASS INDEX: 34.52 KG/M2 | HEART RATE: 89 BPM

## 2023-11-28 DIAGNOSIS — R06.83 SNORING: ICD-10-CM

## 2023-11-28 DIAGNOSIS — R03.0 ELEVATED BLOOD PRESSURE READING: ICD-10-CM

## 2023-11-28 DIAGNOSIS — I50.20 HEART FAILURE WITH REDUCED EJECTION FRACTION (HCC): ICD-10-CM

## 2023-11-28 DIAGNOSIS — E66.09 CLASS 1 OBESITY DUE TO EXCESS CALORIES WITHOUT SERIOUS COMORBIDITY WITH BODY MASS INDEX (BMI) OF 34.0 TO 34.9 IN ADULT: ICD-10-CM

## 2023-11-28 DIAGNOSIS — G47.33 OSA (OBSTRUCTIVE SLEEP APNEA): Primary | ICD-10-CM

## 2023-11-28 PROCEDURE — 99204 OFFICE O/P NEW MOD 45 MIN: CPT | Performed by: INTERNAL MEDICINE

## 2023-11-28 NOTE — PROGRESS NOTES
Sleep Consultation   Lexa Al 39 y.o. male MRN: [id number]      Reason for consultation: Obstructive sleep apnea    Requesting physician: Raj Cherry, DO     Assessment/Plan    1. Severe obstructive sleep apnea  Diagnostic PSG 1/11/23 AHI 66.5, GINA 3.7, Cheyne stoke respiration noted   CPAP study 4/1/23 optimal setting 19/14 AHI of 0, No evidence of central apneas or Cheyne-Moore respiration  Signs and symptoms include snoring  Barclay score of 0    Plan  Ordered auto BiPAP with minimum EPAP 14, pressure support 5, max IPAP 25 with full facemask  Follow-up in 3 months for compliance visit  I will monitor compliance data for any evidence of central sleep apnea or Cheyne-Moore respiration, this was not seen on titration study but there was some evidence on initial diagnostic study    2. Snoring  As above    3. Obesity  BMI 34.54  I explained that weight loss can reduce severity of obstructive sleep apnea    4. Elevated blood pressure reading  Currently on Entresto, Toprol, eplerenone, Lasix  Blood pressure 140/88 currently  Recommended he follow-up with his cardiologist    5. Heart failure with reduced ejection fraction  EF as low as 15 to 20% in the past  Echo as of April of this year showed an EF of 45%  Previous echo showed dilated chambers  Diagnostic PSG did show some evidence of Cheyne-Moore respiration that was not seen on titration study  I explained the treatment of obstructive sleep apnea can improve EF      History of Present Illness   HPI:  Lexa Al is a 39 y.o. male with PMHx  of heart failure with reduced ejection fraction, hyperlipidemia who presents for evaluation of obstructive sleep apnea. He reports loud snoring. He is not especially symptomatic. He was referred by cardiology. He has a history of heart failure with reduced ejection fraction with an EF as low as 15 to 20% in the past.  Etiology is unclear.   His initial diagnostic PSG showed some central apneas and evidence of Cheyne-Moore respiration. His EF has improved and is 45% as of this April. He denies excessive daytime sleepiness and has an Sandy score of 0. He does not feel especially tired. However he does note that after he underwent titration study he felt he had increased energy the next day. He is excited to start PAP therapy. He regularly follows up with cardiology. He typically goes to bed at 2 AM and wakes up at 11 AM.  He averages 6 hours of sleep per night. He wakes up occasionally to use the bathroom at night. He drinks 13 to 24 ounces of soda daily. Review of Systems      Genitourinary none   Cardiology none   Gastrointestinal none   Neurology none   Constitutional none   Integumentary none   Psychiatry none   Musculoskeletal none   Pulmonary none   ENT none   Endocrine none   Hematological none         Historical Information   No past medical history on file. Past Surgical History:   Procedure Laterality Date    CARDIAC CATHETERIZATION Left 12/6/2022    Procedure: CARDIAC LEFT HEART CATH;  Surgeon: Anila Rondon MD;  Location: [facility]; Service: Cardiology    CARDIAC CATHETERIZATION N/A 12/6/2022    Procedure: Cardiac Coronary Angiogram;  Surgeon: Anila Rondon MD;  [facility]: [facility]; Service: Cardiology     No family history on file.   Social History     Socioeconomic History    Marital status: /Civil Union     Spouse name: Not on file    Number of children: Not on file    Years of education: Not on file    Highest education level: Not on file   Occupational History    Not on file   Tobacco Use    Smoking status: Former    Smokeless tobacco: Never   Substance and Sexual Activity    Alcohol use: Not Currently    Drug use: Never    Sexual activity: Not on file   Other Topics Concern    Not on file   Social History Narrative    Not on file     Social Determinants of Health     Financial Resource Strain: Not on file   Food Insecurity: Not on file Transportation Needs: Not on file   Physical Activity: Not on file   Stress: Not on file   Social Connections: Not on file   Intimate Partner Violence: Not on file   Housing Stability: Not on file       Occupational History: Employed    Meds/Allergies   No Known Allergies    Home medications:  Prior to Admission medications    Medication Sig Start Date End Date Taking? Authorizing Provider   eplerenone (INSPRA) 25 mg tablet TAKE 1 TABLET (25 MG TOTAL) BY MOUTH DAILY. 11/13/23  Yes Stephanie Jean MD   furosemide (LASIX) 20 mg tablet TAKE 1 TABLET BY MOUTH EVERY DAY AS NEEDED 8/17/23  Yes Stephanie Jean MD   Jardiance 10 MG TABS tablet TAKE 1 TABLET (10 MG TOTAL) BY MOUTH EVERY MORNING. 8/7/23  Yes Quan Del Valle DO   metoprolol succinate (TOPROL-XL) 50 mg 24 hr tablet Take 1 tablet (50 mg total) by mouth 2 (two) times a day 7/19/23 7/18/24 Yes Stephanie Jean MD   sacubitril-valsartan Chelsey Beady)  MG TABS Take 1 tablet by mouth 2 (two) times a day 5/23/23  Yes Stephanie Jean MD       Vitals:   Blood pressure 140/88, pulse 89, height 6' 2" (1.88 m), weight 122 kg (269 lb), SpO2 99 %. , Body mass index is 34.54 kg/m². Neck Circumference: 18.5    Physical Exam  General: Awake alert and oriented x 3, conversant without conversational dyspnea, NAD, normal affect  HEENT:  PERRL, Sclera noninjected, nonicteric OU, Nares patent,  no craniofacial abnormalities, Mucous membranes, moist, no oral lesions, normal dentition, Mallampati class 4  NECK:  Trachea midline, no accessory muscle use, no stridor, no cervical or supraclavicular adenopathy, JVP not elevated  CARDIAC: Reg, single s1/S2, no m/r/g  PULM: CTA bilaterally no wheezing, rhonchi or rales  EXT: No cyanosis, no clubbing, no edema, normal capillary refill  NEURO: no focal neurologic deficits, AAOx3, moving all extremities appropriately    Labs: I have personally reviewed pertinent lab results.   Lab Results   Component Value Date    WBC 8.08 11/25/2022    HGB 16.9 11/25/2022    HCT 52.5 (H) 11/25/2022    MCV 87 11/25/2022     11/25/2022      Lab Results   Component Value Date    CALCIUM 9.3 03/26/2023    K 3.7 03/26/2023    CO2 25 03/26/2023     03/26/2023    BUN 15 03/26/2023    CREATININE 1.07 03/26/2023     Lab Results   Component Value Date    IRON 96 11/25/2022    TIBC 359 11/25/2022    FERRITIN 147 11/25/2022     No results found for: "Namon Chiki"  No results found for: "FOLATE"      Arterial Blood Gas result:  HELIO Triana MD  [facility]

## 2023-11-28 NOTE — TELEPHONE ENCOUNTER
Charlotte Solo MD  P Sleep Medicine [facility]; Arben Middleton  -As patient has severe sleep apnea with an AHI above 60. I have ordered a BiPAP machine. Can you please expedite this? Thank you  --------------------------------------------------------    Called patient and rescheduled DME set up 12/5/23 in [facility]. Rx for CPAP and clinical information sent to [facility] via [employer] and noted that set up to be expedited. Email sent to [address] [place] [address] liaisons making them aware of appointment date and request to expedite.

## 2023-11-29 DIAGNOSIS — I50.20 HEART FAILURE WITH REDUCED EJECTION FRACTION (HCC): ICD-10-CM

## 2023-11-29 LAB

## 2023-11-29 RX ORDER — FUROSEMIDE 20 MG/1
TABLET ORAL
Qty: 90 TABLET | Refills: 1 | Status: SHIPPED | OUTPATIENT
Start: 2023-11-29

## 2023-12-05 LAB

## 2024-02-01 DIAGNOSIS — I50.20 HEART FAILURE WITH REDUCED EJECTION FRACTION (HCC): ICD-10-CM

## 2024-02-01 RX ORDER — EMPAGLIFLOZIN 10 MG/1
TABLET, FILM COATED ORAL
Qty: 90 TABLET | Refills: 1 | Status: SHIPPED | OUTPATIENT
Start: 2024-02-01

## 2024-02-14 NOTE — PROGRESS NOTES
Advanced Heart Failure Outpatient Progress Note - Tobias Avila 44 y.o. male MRN: [id number]    Encounter: [id number]      Assessment/Plan:    Patient Active Problem List    Diagnosis Date Noted    Central sleep apnea with Cheyne-Moore respiration     NIGHAT (obstructive sleep apnea)     Dilated cardiomyopathy (HCC) 12/06/2022    Heart failure with reduced ejection fraction (HCC) 11/22/2022    Mixed hyperlipidemia 11/22/2022    Suspected sleep apnea 11/22/2022    Elevated BP without diagnosis of hypertension 11/22/2022       # Chronic heart failure with improved ejection fraction, Stage C, NYHA I/II  Etiology: Nonischemic. Suspect chronic given dilated LV. Unclear etiology. Reports father had a heart attack/sudden death at 60 y/o.  No history of heavy alcohol or drug use. No supplements or steroid use. Tachycardic but sinus rhythm with no conduction abnormality on EKG. TSH normal. No inflammation, infiltrative disease or scarring on cardiac MRI.   Euvolemic, warm on exam    Weight: 284 lbs 11/21/22; 277 lbs 11/30/22; 271 lbs  12/12/22; 263 lbs 1/25/23; 264 lbs 3/16/23; 256 lbs 9/26/23; 261 lbs 2/20/24  BNP: 349 11/4/22    Studies- personally reviewed by me    Serology: TSH normal, iron panel normal    Echo 4/5/23:   LVEF 45%  LVIDD 6.7cm  Normal RV systolic function    Echo 3/2/23:  LVEF 30-35%  LVIDD 6.9 cm  Normal RV size and systolic function    Cardiac MRI 12/27/22: LVEF 17%.  LVIDD 7.8 cm.  Top normal RV size with mildly reduced systolic function. No evidence of myocardial inflammation, infiltrative disease or scarring.     Cardiac cath 12/6/22: normal coronaries, LVEDP 18mmHg    Echocardiogram 11/17/22  LVEF: 15%  LVIDd: 7.3cm  RV: mildly dilated, mildly reduced systolic function  MR: trace  PASP: inadequate TR envelope for estimation  RVOT: no notching  Other: mildly increased wall thickness    EKG: sinus tachycardia, IVCD - QRSd 130 msec, T wave abnormality in the anterolateral leads    Diet:  2 g sodium  "diet  2000 mL fluid restriction    Neurohormonal Blockade:  --Beta-Blocker: metoprolol succinate 50 mg BID  --ACEi, ARB or ARNi: entresto  mg BID  --Aldosterone Receptor Blocker: eplerenone 25mg once a day  --SGLT2 Inhibitor: Jardiance 10 mg daily  --Diuretic: furosemide 20mg daily as needed    Sudden Cardiac Death Risk Reduction:  --ICD: LVEF improved to >35%    Electrical Resynchronization:  --Candidacy for BiV device: IVCD QRSd 130 msec    Advanced Therapies (if appropriate):  --We will continue to monitor    # Mixed hyperlipidemia  22:  HDL 44   ASCVD risk: 3.2%  # Sinus tachycardia, likely maladaptive response  # Severe sleep apnea, on sleep study 23, on BIPAP since 2023  # Hypertension, elevated on office visit today, home BP recordings 117-125/70s-80s. Wellness center visit recently with BP 110s/80s    Today's Plan:  Continue current medications  Routine labs with PCP  2g sodium diet  2L fluid restriction  Daily weights  Physical activities/exercise as tolerated  Please call office for blood pressure recordings of SBP >130 or pulse rate >90        HPI:   44 y.o.  male with no known significant PMH who was diagnosed with cardiomyopathy 2022. He pulled a muscle at work and had pinged nerve. He had some other testing while being evaluated and found to have an abnormal EKG. He was also noticing shortness of breath laying down and PND at that time. He subsequently had an echocardiogram which showed dilated LV at 7.3cm and severely reduced ejection fraction at 15%. Last wellness check showed mild tachycardia in 2021. Otherwise no other cardiac or medical history. Blood pressures were normal. He used to work out doing 20 mins of cardio and 20 mins of lifting until he got busy. Recently noticing shortness of breath when running up the steps. Continues to have orthopnea and PND. No leg swelling. Some abdominal fullness.   FH: father  at of \"heart attack\" at 61; unclear if " with CAD; no other known heart disease or sudden death in family  SH: quit smoking 6 years ago, smoked 1/2 ppd x 17 years; no alcohol use; used marijuana during teenage years.  No flu-like or viral syndrome. Got covid vaccines 5/2021 and 11/2021 11/22/22: Here for follow up. On last visit: Started entresto 24-26mg, metoprolol and furosemide. 11/25/22 Na 139 K 4.1 creatinine 1.4. He is overall feeling better. He is able to sleep better, no orthopnea or leg swelling. Weight down 7 lbs. Tolerating medications. Scheduled for cardiac cath, cardiac MRI and sleep study.    12/9/22: here for follow up. Increased metoprolol succinate 25mg two times a day and decreased furosemide to 20mg daily as needed for weight gain. Cardiac cath showed normal coronaries, LVEDP 18mmHg. 12/6/22 Na 139 K 4.2 and creatinine 1.18.   Walking up a flight of stairs no shortness of breath. No orthopnea or PND    1/25/23: Here for follow up. Overall doing well. Staying active, power walking for 20mins and does hand bike 20 mins. Sleep study showed severe sleep apnea, scheduled for CPAP study and sleep medicine consult. No shortness of breath, leg swelling, PND or orthopnea. Taking medications as prescribed. PRN lasix dose last week.    3/16/23: Here for follow up. Repeat echo 3/2 showed LVEF 30-35%. Overall doing well. Exercises in moderation on the treadmill. No shortness of breath or chest tightness. No palpitations or lightheadedness. Adherent with medications. Home BP 110s/70s. HR 70s-80s    6/28/23: Here for follow up. Overall doing well. Repeat echo 4/5/23 showed LVEF of 45%. Lifevest returned. Tolerating increased dose of metoprolol on last visit. Denies shortness of breath or chest pain. No palpitations or lightheadedness. Adherent with medications.     9/25/23: here for follow up. Increased metoprolol to 50mg BID on last visit. Overall feeling well. Staying active doing power walks. Eating healthier, lost 5 lbs since last visit. No  shortness of breath, leg swelling, PND or orthopnea. Pulse rate elevated on office visit today.     Interval History:  2/20/24: Here for follow up.  He reports overall doing fine.  Continues to stay active doing power walks for 30 minutes, 3.4 speed, elevation 10.  Denies shortness of breath or chest pain on current level of exertion.  No PND, orthopnea or lower extremity edema.  No palpitations, dizziness or lightheadedness.  Adherent with medications.  Home blood pressure recordings usually controlled 1 17-1 25 over 70s to 80s.  Recent wellness visit blood pressure 110/80.  Using BiPAP since December 5, 2023 and overall overall feeling better with improved sleep and energy level since then.      History reviewed. No pertinent past medical history.    Review of Systems   Constitutional:  Negative for chills and fever.   HENT:  Negative for ear pain and sore throat.    Eyes:  Negative for pain and visual disturbance.   Respiratory:  Negative for cough and shortness of breath.    Cardiovascular:  Negative for chest pain, palpitations and leg swelling.   Gastrointestinal:  Negative for abdominal distention, abdominal pain and vomiting.   Genitourinary:  Negative for dysuria and hematuria.   Musculoskeletal:  Negative for arthralgias and back pain.   Skin:  Negative for color change and rash.   Neurological:  Negative for dizziness, seizures, syncope and light-headedness.   All other systems reviewed and are negative.       No Known Allergies  .    Current Outpatient Medications:     eplerenone (INSPRA) 25 mg tablet, TAKE 1 TABLET (25 MG TOTAL) BY MOUTH DAILY., Disp: 90 tablet, Rfl: 2    furosemide (LASIX) 20 mg tablet, TAKE 1 TABLET BY MOUTH EVERY DAY AS NEEDED, Disp: 90 tablet, Rfl: 1    Jardiance 10 MG TABS tablet, TAKE 1 TABLET (10 MG TOTAL) BY MOUTH EVERY MORNING., Disp: 90 tablet, Rfl: 1    metoprolol succinate (TOPROL-XL) 50 mg 24 hr tablet, Take 1 tablet (50 mg total) by mouth 2 (two) times a day, Disp: 180  "tablet, Rfl: 2    sacubitril-valsartan (Entresto)  MG TABS, Take 1 tablet by mouth 2 (two) times a day, Disp: 180 tablet, Rfl: 3    Social History     Socioeconomic History    Marital status: /Civil Union     Spouse name: Not on file    Number of children: Not on file    Years of education: Not on file    Highest education level: Not on file   Occupational History    Not on file   Tobacco Use    Smoking status: Former    Smokeless tobacco: Never   Substance and Sexual Activity    Alcohol use: Not Currently    Drug use: Never    Sexual activity: Not on file   Other Topics Concern    Not on file   Social History Narrative    Not on file     Social Determinants of Health     Financial Resource Strain: Not on file   Food Insecurity: Not on file   Transportation Needs: Not on file   Physical Activity: Not on file   Stress: Not on file (2/9/2021)   Social Connections: Not on file   Intimate Partner Violence: Low Risk  (4/13/2021)    Received from [facility]    Intimate Partner Violence     Insults You: Not on file     Threatens You: Not on file     Screams at You: Not on file     Physically Hurt: Not on file     Intimate Partner Violence Score: Not on file   Housing Stability: Not on file       History reviewed. No pertinent family history.    Physical Exam:    Vitals: Blood pressure 142/90, pulse 100, height 6' 2\" (1.88 m), weight 118 kg (261 lb 3.2 oz), SpO2 99%., Body mass index is 33.54 kg/m².,   Wt Readings from Last 3 Encounters:   02/20/24 118 kg (261 lb 3.2 oz)   11/28/23 122 kg (269 lb)   09/26/23 117 kg (256 lb 14.4 oz)       Physical Exam  Constitutional:       General: He is not in acute distress.     Appearance: Normal appearance.   HENT:      Head: Normocephalic and atraumatic.      Mouth/Throat:      Mouth: Mucous membranes are moist.   Eyes:      General: No scleral icterus.     Extraocular Movements: Extraocular movements intact.   Cardiovascular:      Rate and Rhythm: Normal rate and " "regular rhythm.      Pulses: Normal pulses.      Heart sounds: S1 normal and S2 normal. No murmur heard.     No friction rub. No gallop.      Comments: Nonelevated JVP  Pulmonary:      Breath sounds: Normal breath sounds.   Abdominal:      General: There is no distension.      Palpations: Abdomen is soft.      Tenderness: There is no abdominal tenderness. There is no guarding or rebound.   Musculoskeletal:         General: Normal range of motion.      Cervical back: Neck supple.      Right lower leg: No edema.      Left lower leg: No edema.   Skin:     General: Skin is warm and dry.      Capillary Refill: Capillary refill takes less than 2 seconds.   Neurological:      General: No focal deficit present.      Mental Status: He is alert and oriented to person, place, and time.   Psychiatric:         Mood and Affect: Mood normal.         Labs & Results:    Lab Results   Component Value Date    WBC 8.08 11/25/2022    HGB 16.9 11/25/2022    HCT 52.5 (H) 11/25/2022    MCV 87 11/25/2022     11/25/2022     Lab Results   Component Value Date    SODIUM 140 03/26/2023    K 3.7 03/26/2023     03/26/2023    CO2 25 03/26/2023    BUN 15 03/26/2023    CREATININE 1.07 03/26/2023    GLUC 86 02/14/2023    CALCIUM 9.3 03/26/2023     No results found for: \"NTBNP\"   No results found for: \"CHOLESTEROL\"  No results found for: \"HDL\"  No results found for: \"TRIG\"  No results found for: \"NONHDLC\"    EKG personally reviewed by Keisha Dolan MD.     Counseling / Coordination of Care  Time spent today 25 minutes.  Greater than 50% of total time was spent with the patient and / or family counseling and / or coordination of care.  We discussed diagnoses, most recent studies and any changes in treatment    Thank you for the opportunity to participate in the care of this patient.    KEISHA DOLAN MD  ADVANCED HEART FAILURE AND MECHANICAL CIRCULATORY SUPPORT  [facility]    "

## 2024-02-20 ENCOUNTER — OFFICE VISIT (OUTPATIENT)
Dept: CARDIOLOGY CLINIC | Facility: CLINIC | Age: 44
End: 2024-02-20
Payer: COMMERCIAL

## 2024-02-20 VITALS
WEIGHT: 261.2 LBS | HEIGHT: 74 IN | SYSTOLIC BLOOD PRESSURE: 142 MMHG | OXYGEN SATURATION: 99 % | DIASTOLIC BLOOD PRESSURE: 90 MMHG | HEART RATE: 100 BPM | BODY MASS INDEX: 33.52 KG/M2

## 2024-02-20 DIAGNOSIS — G47.33 OSA (OBSTRUCTIVE SLEEP APNEA): ICD-10-CM

## 2024-02-20 DIAGNOSIS — I10 PRIMARY HYPERTENSION: ICD-10-CM

## 2024-02-20 DIAGNOSIS — E78.2 MIXED HYPERLIPIDEMIA: ICD-10-CM

## 2024-02-20 DIAGNOSIS — I50.22 HEART FAILURE WITH IMPROVED EJECTION FRACTION (HFIMPEF) (HCC): Primary | ICD-10-CM

## 2024-02-20 DIAGNOSIS — I42.8 NONISCHEMIC CARDIOMYOPATHY (HCC): ICD-10-CM

## 2024-02-20 PROCEDURE — 99214 OFFICE O/P EST MOD 30 MIN: CPT | Performed by: INTERNAL MEDICINE

## 2024-02-20 NOTE — PATIENT INSTRUCTIONS
Continue current medications  Routine labs with PCP  2g sodium diet  2L fluid restriction  Daily weights  Physical activities/exercise as tolerated  Please call office for blood pressure recordings of SBP >130 or pulse rate >90

## 2024-03-05 ENCOUNTER — OFFICE VISIT (OUTPATIENT)
Dept: SLEEP CENTER | Facility: CLINIC | Age: 44
End: 2024-03-05
Payer: COMMERCIAL

## 2024-03-05 VITALS
SYSTOLIC BLOOD PRESSURE: 120 MMHG | DIASTOLIC BLOOD PRESSURE: 82 MMHG | WEIGHT: 260 LBS | HEIGHT: 74 IN | BODY MASS INDEX: 33.37 KG/M2

## 2024-03-05 DIAGNOSIS — I50.20 HEART FAILURE WITH REDUCED EJECTION FRACTION (HCC): ICD-10-CM

## 2024-03-05 DIAGNOSIS — F51.12 INSUFFICIENT SLEEP SYNDROME: ICD-10-CM

## 2024-03-05 DIAGNOSIS — G47.33 OSA (OBSTRUCTIVE SLEEP APNEA): Primary | ICD-10-CM

## 2024-03-05 DIAGNOSIS — R06.83 SNORING: ICD-10-CM

## 2024-03-05 PROCEDURE — 99213 OFFICE O/P EST LOW 20 MIN: CPT | Performed by: INTERNAL MEDICINE

## 2024-03-05 NOTE — PROGRESS NOTES
Progress Note - Sleep Medicine  Tobias Avila 44 y.o. male MRN: [id number]       Impression & Plan:       1.  Severe obstructive sleep apnea  Diagnostic PSG AHI 66.5, central apnea index 3.7, Cheyne-Moore respiration noted  CPAP study 4/1/2023 showed optimal pressure BiPAP 19/14 with no evidence of central apneas or Cheyne-Moore respiration    Compliance from 1/30 - 2/28  More than 4 hours 43%, 3 hours and 58 minutes  On minimum EPAP 14, pressure support 5, max IPAP 25  95 percentile leak 17.2  Residual AHI 5.4    95th percentile pressure 22/17    Current [place] score 3  Patient states he is only sleeping 2 to 5 hours per night    States he is doing well    Plan  Follow-up in 6 months    2. Insufficient sleep syndrome   He is only sleeping 2 to 5 hours per night as he is working second shift from 2 PM to 10 PM and takes care of his grandkids from 7 AM to 2 PM  He is working out at the gym after work at 11:30 PM  Counseled patient to sleep for at least 7 hours at night    Plan  Follow-up in 6 months  Recommend he sleeps 7 hours a night    3. Snoring   Improved with BiPAP use    4.  Heart failure with reduced ejection fraction  Counseled patient on the importance of using BiPAP for 7 hours at night as adequate treatment of sleep apnea can improve ejection fraction    ______________________________________________________________________    HPI:    Tobias Avila Pleasant 44-year-old male with past medical history of heart failure with reduced ejection fraction, hyperlipidemia, severe obstructive sleep apnea.  Initial sleep study showed an AHI of six 6.5.  CPAP study showed optimal pressure at BiPAP 19/14.  Auto BiPAP has been ordered.  Patient has somewhat poor compliance as he is only sleeping 2 to 5 hours per night.  He does note vast improvement in symptoms when he does use his BiPAP.  His current [place] score is 3.  He is no longer snoring.  He states he feels more refreshed when he uses it.        Review  "of Systems:  Review of Systems   All other systems reviewed and are negative.        Social history updates:  Social History     Tobacco Use   Smoking Status Former   Smokeless Tobacco Never     Social History     Socioeconomic History    Marital status: /Civil Union     Spouse name: Not on file    Number of children: Not on file    Years of education: Not on file    Highest education level: Not on file   Occupational History    Not on file   Tobacco Use    Smoking status: Former    Smokeless tobacco: Never   Substance and Sexual Activity    Alcohol use: Not Currently    Drug use: Never    Sexual activity: Not on file   Other Topics Concern    Not on file   Social History Narrative    Not on file     Social Determinants of Health     Financial Resource Strain: Not on file   Food Insecurity: Not on file   Transportation Needs: Not on file   Physical Activity: Not on file   Stress: Not on file (2/9/2021)   Social Connections: Not on file   Intimate Partner Violence: Low Risk  (4/13/2021)    Received from [facility]    Intimate Partner Violence     Insults You: Not on file     Threatens You: Not on file     Screams at You: Not on file     Physically Hurt: Not on file     Intimate Partner Violence Score: Not on file   Housing Stability: Not on file       PhysicalExamination:  Vitals:   /82   Ht 6' 2\" (1.88 m)   Wt 118 kg (260 lb)   BMI 33.38 kg/m²     Physical Exam  General:  Awake alert and oriented x 3, conversant without conversational dyspnea, NAD, normal affect  HEENT:  PERRL, Sclera noninjected, nonicteric OU, Nares patent,  no craniofacial abnormalities, Mucous membranes, moist, no oral lesions, normal dentition  NECK: Trachea midline, no accessory muscle use, no stridor, no cervical or supraclavicular adenopathy, JVP not elevated  CARDIAC: Reg, single s1/S2, no m/r/g  PULM: CTA bilaterally no wheezing, rhonchi or rales  EXT: No cyanosis, no clubbing, no edema, normal capillary refill  NEURO: " "no focal neurologic deficits, AAOx3, moving all extremities appropriately     Diagnostic Data:  Labs:  I personally reviewed the most recent laboratory data pertinent to today's visit  Telephone on 11/28/2023   Component Date Value    Supplier Name 11/28/2023 [employer]     Supplier Phone Number 11/28/2023 [phone]     Order Status 11/28/2023 Delivery Successful     Delivery Note 11/28/2023 [place].  Needs expedited set up.  [facility] office liaisons aware.     Delivery Request Date 11/28/2023 12/05/2023     Date Delivered  11/28/2023 12/05/2023     Supplier Name 11/28/2023 12/05/2023     Item Description 11/28/2023 BiLevel Machine, Resmed S10 Aircurve Auto BiLevel     Item Description 11/28/2023 PAP Mask, Full Face, Fit Upon Setup, N/A, 1 per 3 months     Item Description 11/28/2023 PAP Mask Interface Cushion, Full Face, 1 per 1 month     Item Description 11/28/2023 PAP Headgear, 1 per 6 months     Item Description 11/28/2023 PAP Humidifier, Heated     Item Description 11/28/2023 Disposable PAP Filter, 2 per 1 month     Item Description 11/28/2023 Non-Disposable PAP Filter, 1 per 6 months     Item Description 11/28/2023 PAP Machine Tubing, Heated, 1 per 3 months     Item Description 11/28/2023 PAP Monitoring Modem        I have personally reviewed pertinent lab results.  Lab Results   Component Value Date    WBC 8.08 11/25/2022    HGB 16.9 11/25/2022    HCT 52.5 (H) 11/25/2022    MCV 87 11/25/2022     11/25/2022     Lab Results   Component Value Date    CALCIUM 9.3 03/26/2023    K 3.7 03/26/2023    CO2 25 03/26/2023     03/26/2023    BUN 15 03/26/2023    CREATININE 1.07 03/26/2023     No results found for: \"IGE\"  Lab Results   Component Value Date    ALT 44 11/25/2022    AST 29 11/25/2022    ALKPHOS 62 11/25/2022     Lab Results   Component Value Date    IRON 96 11/25/2022    TIBC 359 11/25/2022    FERRITIN 147 11/25/2022     No results found for: \"FLLVOGVR33\"  No results " "found for: \"FOLATE\"      Arterial Blood Gas result:  HELIO Shepard MD  [facility] Sleep Medicine    "

## 2024-04-16 DIAGNOSIS — I50.20 HEART FAILURE WITH REDUCED EJECTION FRACTION (HCC): ICD-10-CM

## 2024-04-16 RX ORDER — METOPROLOL SUCCINATE 50 MG/1
50 TABLET, EXTENDED RELEASE ORAL 2 TIMES DAILY
Qty: 180 TABLET | Refills: 1 | Status: SHIPPED | OUTPATIENT
Start: 2024-04-16

## 2024-06-07 DIAGNOSIS — I10 PRIMARY HYPERTENSION: ICD-10-CM

## 2024-06-07 DIAGNOSIS — I50.20 HEART FAILURE WITH REDUCED EJECTION FRACTION (HCC): ICD-10-CM

## 2024-06-07 RX ORDER — SACUBITRIL AND VALSARTAN 97; 103 MG/1; MG/1
1 TABLET, FILM COATED ORAL 2 TIMES DAILY
Qty: 60 TABLET | Refills: 0 | Status: SHIPPED | OUTPATIENT
Start: 2024-06-07

## 2024-07-08 DIAGNOSIS — I10 PRIMARY HYPERTENSION: ICD-10-CM

## 2024-07-08 DIAGNOSIS — I50.20 HEART FAILURE WITH REDUCED EJECTION FRACTION (HCC): ICD-10-CM

## 2024-07-09 RX ORDER — SACUBITRIL AND VALSARTAN 97; 103 MG/1; MG/1
1 TABLET, FILM COATED ORAL 2 TIMES DAILY
Qty: 60 TABLET | Refills: 3 | Status: SHIPPED | OUTPATIENT
Start: 2024-07-09

## 2024-08-01 DIAGNOSIS — I50.20 HEART FAILURE WITH REDUCED EJECTION FRACTION (HCC): ICD-10-CM

## 2024-08-01 RX ORDER — EMPAGLIFLOZIN 10 MG/1
TABLET, FILM COATED ORAL
Qty: 30 TABLET | Refills: 0 | Status: SHIPPED | OUTPATIENT
Start: 2024-08-01

## 2024-08-13 DIAGNOSIS — I50.20 HEART FAILURE WITH REDUCED EJECTION FRACTION (HCC): ICD-10-CM

## 2024-08-13 DIAGNOSIS — I50.32 HEART FAILURE WITH IMPROVED EJECTION FRACTION (HFIMPEF) (HCC): Primary | ICD-10-CM

## 2024-08-13 RX ORDER — EPLERENONE 25 MG/1
25 TABLET, FILM COATED ORAL DAILY
Qty: 90 TABLET | Refills: 0 | Status: SHIPPED | OUTPATIENT
Start: 2024-08-13

## 2024-08-13 RX ORDER — FUROSEMIDE 20 MG
TABLET ORAL
Qty: 90 TABLET | Refills: 0 | Status: SHIPPED | OUTPATIENT
Start: 2024-08-13

## 2024-08-29 DIAGNOSIS — I50.20 HEART FAILURE WITH REDUCED EJECTION FRACTION (HCC): ICD-10-CM

## 2024-08-30 RX ORDER — EMPAGLIFLOZIN 10 MG/1
TABLET, FILM COATED ORAL
Qty: 30 TABLET | Refills: 5 | Status: SHIPPED | OUTPATIENT
Start: 2024-08-30

## 2024-09-22 NOTE — PROGRESS NOTES
Advanced Heart Failure Outpatient Progress Note - Tobias Avila 44 y.o. male MRN: [id number]    Encounter: [id number]      Assessment/Plan:    Patient Active Problem List    Diagnosis Date Noted    Central sleep apnea with Cheyne-Moore respiration     NIGHAT (obstructive sleep apnea)     Dilated cardiomyopathy (HCC) 12/06/2022    Heart failure with reduced ejection fraction (HCC) 11/22/2022    Mixed hyperlipidemia 11/22/2022    Suspected sleep apnea 11/22/2022    Elevated BP without diagnosis of hypertension 11/22/2022       # Chronic heart failure with improved ejection fraction, Stage C, NYHA I/II  Etiology: Nonischemic. Suspect chronic given dilated LV. Unclear etiology. Reports father had a heart attack/sudden death at 60 y/o.  No history of heavy alcohol or drug use. No supplements or steroid use. Tachycardic but sinus rhythm with no conduction abnormality on EKG. TSH normal. No inflammation, infiltrative disease or scarring on cardiac MRI.   Euvolemic, warm on exam    Weight: 264 lbs 3/16/23; 256 lbs 9/26/23; 261 lbs 2/20/24; 265 lbs 9/23/24  BNP: 349 11/4/22    Studies- personally reviewed by me    Serology: TSH normal, iron panel normal    Echo 4/5/23:   LVEF 45%  LVIDD 6.7cm  Normal RV systolic function    Echo 3/2/23:  LVEF 30-35%  LVIDD 6.9 cm  Normal RV size and systolic function    Cardiac MRI 12/27/22: LVEF 17%.  LVIDD 7.8 cm.  Top normal RV size with mildly reduced systolic function. No evidence of myocardial inflammation, infiltrative disease or scarring.     Cardiac cath 12/6/22: normal coronaries, LVEDP 18mmHg    Echocardiogram 11/17/22  LVEF: 15%  LVIDd: 7.3cm  RV: mildly dilated, mildly reduced systolic function  MR: trace  PASP: inadequate TR envelope for estimation  RVOT: no notching  Other: mildly increased wall thickness    EKG: sinus tachycardia, IVCD - QRSd 130 msec, T wave abnormality in the anterolateral leads    Diet:  2 g sodium diet  2000 mL fluid restriction    Neurohormonal  Blockade:  --Beta-Blocker: metoprolol succinate 50 mg BID  --ACEi, ARB or ARNi: entresto  mg BID  --Aldosterone Receptor Blocker: eplerenone 25mg once a day  --SGLT2 Inhibitor: Jardiance 10 mg daily  --Diuretic: furosemide 20mg daily as needed    Sudden Cardiac Death Risk Reduction:  --ICD: LVEF improved to >35%    Electrical Resynchronization:  --Candidacy for BiV device: IVCD QRSd 130 msec    Advanced Therapies (if appropriate):  --We will continue to monitor    # Mixed hyperlipidemia  6/20/22:  HDL 44   ASCVD risk: 3.2%  # Sinus tachycardia, likely maladaptive response, HR in the 60s in out of office checks  # Severe sleep apnea, on sleep study 1/12/23, on BIPAP since 12/5/2023, adherent to use  # Hypertension, BP higher on office visits. Out of office blood pressure recordings 120s/70s-80s and pulse in the 60s.  Continue GDMT as above      Today's Plan:  Patient to contact PCP to fax over recent lab results  2g sodium diet  2L fluid restriction  Daily weights  Physical activities/exercise as tolerated  Plan to repeat echocardiogram after next office visit      HPI:   44 y.o.  with PMH as above who is here for follow up.     6/28/23: Here for follow up. Overall doing well. Repeat echo 4/5/23 showed LVEF of 45%. Lifevest returned. Tolerating increased dose of metoprolol on last visit. Denies shortness of breath or chest pain. No palpitations or lightheadedness. Adherent with medications.     9/25/23: here for follow up. Increased metoprolol to 50mg BID on last visit. Overall feeling well. Staying active doing power walks. Eating healthier, lost 5 lbs since last visit. No shortness of breath, leg swelling, PND or orthopnea. Pulse rate elevated on office visit today.     2/20/24: Here for follow up.  He reports overall doing fine.  Continues to stay active doing power walks for 30 minutes, 3.4 speed, elevation 10.  Denies shortness of breath or chest pain on current level of exertion.  No PND,  orthopnea or lower extremity edema.  No palpitations, dizziness or lightheadedness.  Adherent with medications.  Home blood pressure recordings usually controlled 1 17-1 25 over 70s to 80s.  Recent wellness visit blood pressure 110/80.  Using BiPAP since December 5, 2023 and overall overall feeling better with improved sleep and energy level since then.    9/23/24: Here for follow up.  Reports overall doing well.  He continues to stay active.  Taking medication as prescribed.  He exercises 40 minutes total a day with cardio exercises for 20 minutes each.  He denies chest pain or shortness of breath on current level of exertion.  Denies PND, orthopnea or lower extremity edema.  Out of office blood pressure recordings 120s/70s-80s and pulse in the 60s.    No past medical history on file.    Review of Systems   Constitutional:  Negative for chills and fever.   HENT:  Negative for ear pain and sore throat.    Eyes:  Negative for pain and visual disturbance.   Respiratory:  Negative for cough and shortness of breath.    Cardiovascular:  Negative for chest pain, palpitations and leg swelling.   Gastrointestinal:  Negative for abdominal distention, abdominal pain and vomiting.   Genitourinary:  Negative for dysuria and hematuria.   Musculoskeletal:  Negative for arthralgias and back pain.   Skin:  Negative for color change and rash.   Neurological:  Negative for dizziness, seizures, syncope and light-headedness.   All other systems reviewed and are negative.       No Known Allergies  .    Current Outpatient Medications:     Empagliflozin (Jardiance) 10 MG TABS tablet, TAKE 1 TABLET (10 MG TOTAL) BY MOUTH EVERY MORNING., Disp: 30 tablet, Rfl: 5    eplerenone (INSPRA) 25 mg tablet, TAKE 1 TABLET (25 MG TOTAL) BY MOUTH DAILY., Disp: 90 tablet, Rfl: 0    furosemide (LASIX) 20 mg tablet, TAKE 1 TABLET BY MOUTH AS NEEDED, Disp: 90 tablet, Rfl: 0    metoprolol succinate (TOPROL-XL) 50 mg 24 hr tablet, TAKE 1 TABLET BY MOUTH TWICE  "A DAY, Disp: 180 tablet, Rfl: 1    sacubitril-valsartan (Entresto)  MG TABS, TAKE 1 TABLET BY MOUTH TWICE A DAY, Disp: 60 tablet, Rfl: 3    Social History     Socioeconomic History    Marital status: /Civil Union     Spouse name: Not on file    Number of children: Not on file    Years of education: Not on file    Highest education level: Not on file   Occupational History    Not on file   Tobacco Use    Smoking status: Former    Smokeless tobacco: Never   Substance and Sexual Activity    Alcohol use: Not Currently    Drug use: Never    Sexual activity: Not on file   Other Topics Concern    Not on file   Social History Narrative    Not on file     Social Determinants of Health     Financial Resource Strain: Not on file   Food Insecurity: Not on file   Transportation Needs: Not on file   Physical Activity: Not on file   Stress: Not on file (2/9/2021)   Social Connections: Not on file   Intimate Partner Violence: Low Risk  (4/13/2021)    [facility]    Intimate Partner Violence     Insults You: Not on file     Threatens You: Not on file     Screams at You: Not on file     Physically Hurt: Not on file     Intimate Partner Violence Score: Not on file   Housing Stability: Not on file       No family history on file.    Physical Exam:    Vitals: Blood pressure 130/90, pulse 97, height 6' 2\" (1.88 m), weight 120 kg (265 lb 9.6 oz), SpO2 100%., Body mass index is 34.1 kg/m².,   Wt Readings from Last 3 Encounters:   09/23/24 120 kg (265 lb 9.6 oz)   03/05/24 118 kg (260 lb)   02/20/24 118 kg (261 lb 3.2 oz)       Physical Exam  Constitutional:       General: He is not in acute distress.     Appearance: Normal appearance.   HENT:      Head: Normocephalic and atraumatic.      Mouth/Throat:      Mouth: Mucous membranes are moist.   Eyes:      General: No scleral icterus.     Extraocular Movements: Extraocular movements intact.   Cardiovascular:      Rate and Rhythm: Normal rate and " "regular rhythm.      Pulses: Normal pulses.      Heart sounds: S1 normal and S2 normal. No murmur heard.     No friction rub. No gallop.      Comments: Nonelevated JVP  Pulmonary:      Breath sounds: Normal breath sounds.   Abdominal:      General: There is no distension.      Palpations: Abdomen is soft.      Tenderness: There is no abdominal tenderness. There is no guarding or rebound.   Musculoskeletal:         General: Normal range of motion.      Cervical back: Neck supple.      Right lower leg: No edema.      Left lower leg: No edema.   Skin:     General: Skin is warm and dry.      Capillary Refill: Capillary refill takes less than 2 seconds.   Neurological:      General: No focal deficit present.      Mental Status: He is alert and oriented to person, place, and time.   Psychiatric:         Mood and Affect: Mood normal.         Labs & Results:    Lab Results   Component Value Date    WBC 8.08 11/25/2022    HGB 16.9 11/25/2022    HCT 52.5 (H) 11/25/2022    MCV 87 11/25/2022     11/25/2022     Lab Results   Component Value Date    SODIUM 140 03/26/2023    K 3.7 03/26/2023     03/26/2023    CO2 25 03/26/2023    BUN 15 03/26/2023    CREATININE 1.07 03/26/2023    GLUC 86 02/14/2023    CALCIUM 9.3 03/26/2023     No results found for: \"NTBNP\"   No results found for: \"CHOLESTEROL\"  No results found for: \"HDL\"  No results found for: \"TRIG\"  No results found for: \"NONHDLC\"    EKG personally reviewed by Keisha Dolan MD.     Counseling / Coordination of Care  I have spent a total time of 30 minutes in caring for this patient on the day of the visit/encounter including Instructions for management, Impressions, Counseling / Coordination of care, Documenting in the medical record, Reviewing / ordering tests, medicine, procedures  , and Obtaining or reviewing history  .      Thank you for the opportunity to participate in the care of this patient.    KEISHA DOLAN MD  ADVANCED HEART FAILURE AND MECHANICAL " CIRCULATORY SUPPORT  [facility]

## 2024-09-23 ENCOUNTER — OFFICE VISIT (OUTPATIENT)
Dept: CARDIOLOGY CLINIC | Facility: CLINIC | Age: 44
End: 2024-09-23
Payer: COMMERCIAL

## 2024-09-23 VITALS
SYSTOLIC BLOOD PRESSURE: 130 MMHG | WEIGHT: 265.6 LBS | BODY MASS INDEX: 34.08 KG/M2 | DIASTOLIC BLOOD PRESSURE: 90 MMHG | HEART RATE: 97 BPM | HEIGHT: 74 IN | OXYGEN SATURATION: 100 %

## 2024-09-23 DIAGNOSIS — I50.32 HEART FAILURE WITH IMPROVED EJECTION FRACTION (HFIMPEF) (HCC): Primary | ICD-10-CM

## 2024-09-23 DIAGNOSIS — E78.2 MIXED HYPERLIPIDEMIA: ICD-10-CM

## 2024-09-23 DIAGNOSIS — I10 PRIMARY HYPERTENSION: ICD-10-CM

## 2024-09-23 DIAGNOSIS — I42.8 NONISCHEMIC CARDIOMYOPATHY (HCC): ICD-10-CM

## 2024-09-23 DIAGNOSIS — G47.33 OSA (OBSTRUCTIVE SLEEP APNEA): ICD-10-CM

## 2024-09-23 PROCEDURE — 99214 OFFICE O/P EST MOD 30 MIN: CPT | Performed by: INTERNAL MEDICINE

## 2024-09-23 NOTE — PATIENT INSTRUCTIONS
Please send us copy of recent blood work  2g sodium diet  2L fluid restriction  Daily weights  Physical activities/exercise as tolerated

## 2024-10-15 DIAGNOSIS — I50.20 HEART FAILURE WITH REDUCED EJECTION FRACTION (HCC): ICD-10-CM

## 2024-10-16 RX ORDER — METOPROLOL SUCCINATE 50 MG/1
50 TABLET, EXTENDED RELEASE ORAL 2 TIMES DAILY
Qty: 180 TABLET | Refills: 1 | Status: SHIPPED | OUTPATIENT
Start: 2024-10-16

## 2024-11-07 DIAGNOSIS — I50.20 HEART FAILURE WITH REDUCED EJECTION FRACTION (HCC): ICD-10-CM

## 2024-11-07 DIAGNOSIS — I10 PRIMARY HYPERTENSION: ICD-10-CM

## 2024-11-07 RX ORDER — SACUBITRIL AND VALSARTAN 97; 103 MG/1; MG/1
1 TABLET, FILM COATED ORAL 2 TIMES DAILY
Qty: 60 TABLET | Refills: 2 | Status: SHIPPED | OUTPATIENT
Start: 2024-11-07

## 2024-11-14 DIAGNOSIS — I50.20 HEART FAILURE WITH REDUCED EJECTION FRACTION (HCC): ICD-10-CM

## 2024-11-18 RX ORDER — EPLERENONE 25 MG/1
25 TABLET, FILM COATED ORAL DAILY
Qty: 90 TABLET | Refills: 1 | Status: SHIPPED | OUTPATIENT
Start: 2024-11-18

## 2025-02-05 DIAGNOSIS — I10 PRIMARY HYPERTENSION: ICD-10-CM

## 2025-02-05 DIAGNOSIS — I50.20 HEART FAILURE WITH REDUCED EJECTION FRACTION (HCC): ICD-10-CM

## 2025-02-07 RX ORDER — SACUBITRIL AND VALSARTAN 97; 103 MG/1; MG/1
1 TABLET, FILM COATED ORAL 2 TIMES DAILY
Qty: 60 TABLET | Refills: 2 | Status: SHIPPED | OUTPATIENT
Start: 2025-02-07

## 2025-02-21 DIAGNOSIS — I50.20 HEART FAILURE WITH REDUCED EJECTION FRACTION (HCC): ICD-10-CM

## 2025-02-24 DIAGNOSIS — I50.20 HEART FAILURE WITH REDUCED EJECTION FRACTION (HCC): ICD-10-CM

## 2025-02-24 RX ORDER — EMPAGLIFLOZIN 10 MG/1
10 TABLET, FILM COATED ORAL EVERY MORNING
Qty: 30 TABLET | Refills: 5 | Status: SHIPPED | OUTPATIENT
Start: 2025-02-24

## 2025-03-11 DIAGNOSIS — I50.20 HEART FAILURE WITH REDUCED EJECTION FRACTION (HCC): ICD-10-CM

## 2025-03-12 DIAGNOSIS — I50.20 HEART FAILURE WITH REDUCED EJECTION FRACTION (HCC): ICD-10-CM

## 2025-03-13 RX ORDER — FUROSEMIDE 20 MG/1
TABLET ORAL
Qty: 90 TABLET | Refills: 1 | Status: SHIPPED | OUTPATIENT
Start: 2025-03-13

## 2025-03-13 RX ORDER — EPLERENONE 25 MG/1
25 TABLET ORAL DAILY
Qty: 90 TABLET | Refills: 1 | Status: SHIPPED | OUTPATIENT
Start: 2025-03-13

## 2025-03-15 DIAGNOSIS — I50.20 HEART FAILURE WITH REDUCED EJECTION FRACTION (HCC): ICD-10-CM

## 2025-03-17 RX ORDER — METOPROLOL SUCCINATE 50 MG/1
50 TABLET, EXTENDED RELEASE ORAL 2 TIMES DAILY
Qty: 180 TABLET | Refills: 1 | Status: SHIPPED | OUTPATIENT
Start: 2025-03-17

## 2025-03-31 NOTE — PROGRESS NOTES
Advanced Heart Failure Outpatient Progress Note - Tobias Avila 45 y.o. male MRN: [id number]    Encounter: [id number]      Assessment/Plan:    Patient Active Problem List    Diagnosis Date Noted    Central sleep apnea with Cheyne-Moore respiration     NIGHAT (obstructive sleep apnea)     Dilated cardiomyopathy (HCC) 12/06/2022    Heart failure with reduced ejection fraction (HCC) 11/22/2022    Mixed hyperlipidemia 11/22/2022    Suspected sleep apnea 11/22/2022    Elevated BP without diagnosis of hypertension 11/22/2022       # Chronic heart failure with improved ejection fraction, Stage C, NYHA I/II  Etiology: Nonischemic. Suspect chronic given dilated LV. Unclear etiology. Reports father had a heart attack/sudden death at 60 y/o.  No history of heavy alcohol or drug use. No supplements or steroid use. Tachycardic but sinus rhythm with no conduction abnormality on EKG. TSH normal. No inflammation, infiltrative disease or scarring on cardiac MRI.   Euvolemic, warm exam    Weight: 264 lbs 3/16/23; 256 lbs 9/26/23; 261 lbs 2/20/24; 265 lbs 9/23/24; 269 lbs 4/1/2025  BNP: 349 11/4/22    Studies- personally reviewed by me    Serology: TSH normal, iron panel normal    Echo 4/5/23:   LVEF 45%  LVIDD 6.7cm  Normal RV systolic function    Echo 3/2/23:  LVEF 30-35%  LVIDD 6.9 cm  Normal RV size and systolic function    Cardiac MRI 12/27/22: LVEF 17%.  LVIDD 7.8 cm.  Top normal RV size with mildly reduced systolic function. No evidence of myocardial inflammation, infiltrative disease or scarring.     Cardiac cath 12/6/22: normal coronaries, LVEDP 18mmHg    Echocardiogram 11/17/22  LVEF: 15%  LVIDd: 7.3cm  RV: mildly dilated, mildly reduced systolic function  MR: trace  PASP: inadequate TR envelope for estimation  RVOT: no notching  Other: mildly increased wall thickness    EKG: sinus tachycardia, IVCD - QRSd 130 msec, T wave abnormality in the anterolateral leads    Diet:  2 g sodium diet  2000 mL fluid  restriction    Neurohormonal Blockade:  --Beta-Blocker: metoprolol succinate 50 mg BID  --ACEi, ARB or ARNi: entresto  mg BID  --Aldosterone Receptor Blocker: eplerenone 25mg once a day  --SGLT2 Inhibitor: Jardiance 10 mg daily  --Diuretic: furosemide 20mg daily as needed    Sudden Cardiac Death Risk Reduction:  --ICD: LVEF improved to >35%    Electrical Resynchronization:  --Candidacy for BiV device: IVCD QRSd 130 msec    Advanced Therapies (if appropriate):  --We will continue to monitor    # Mixed hyperlipidemia  6/20/22:  HDL 44   3/20/2025: TG 86  HDL 37  ASCVD risk: Low  # Sinus tachycardia, likely maladaptive response, improved   # Severe sleep apnea, on sleep study 1/12/23, on BIPAP since 12/5/2023, adherent to use   # Hypertension, systolic blood pressure remains in the 130s.  Home blood pressure checks with systolic blood pressure between 127-132.    Today's Plan:  Increase eplerenone to 25mg two times a day  Obtain BMP as ordered  Continue rest of cardiac medications  2g sodium diet  2L fluid restriction  Daily weights  Physical activities/exercise as tolerated        HPI:   45 y.o.  with PMH as above who is here for follow up.     6/28/23: Here for follow up. Overall doing well. Repeat echo 4/5/23 showed LVEF of 45%. Lifevest returned. Tolerating increased dose of metoprolol on last visit. Denies shortness of breath or chest pain. No palpitations or lightheadedness. Adherent with medications.     9/25/23: here for follow up. Increased metoprolol to 50mg BID on last visit. Overall feeling well. Staying active doing power walks. Eating healthier, lost 5 lbs since last visit. No shortness of breath, leg swelling, PND or orthopnea. Pulse rate elevated on office visit today.     2/20/24: Here for follow up.  He reports overall doing fine.  Continues to stay active doing power walks for 30 minutes, 3.4 speed, elevation 10.  Denies shortness of breath or chest pain on current level of  exertion.  No PND, orthopnea or lower extremity edema.  No palpitations, dizziness or lightheadedness.  Adherent with medications.  Home blood pressure recordings usually controlled 1 17-1 25 over 70s to 80s.  Recent wellness visit blood pressure 110/80.  Using BiPAP since December 5, 2023 and overall overall feeling better with improved sleep and energy level since then.    9/23/24: Here for follow up.  Reports overall doing well.  He continues to stay active.  Taking medication as prescribed.  He exercises 40 minutes total a day with cardio exercises for 20 minutes each.  He denies chest pain or shortness of breath on current level of exertion.  Denies PND, orthopnea or lower extremity edema.  Out of office blood pressure recordings 120s/70s-80s and pulse in the 60s.    4/1/25: Here for follow up.  He reports overall doing well.  Denies shortness of breath, chest pain on current level of exertion.  No leg swelling, PND or orthopnea.  No dizziness or lightheadedness.  Taking medication as prescribed.  Compliant with BiPAP.  Last labs 3/20/2025 sodium 140 potassium 3.8 creatinine stable at 1.07.  Liver enzymes were normal.   improved from prior.  Hurt his back in January and not as active then but has gradually resumed his exercise routine over the past 6 weeks.      No past medical history on file.    Review of Systems   Constitutional:  Negative for chills and fever.   HENT:  Negative for ear pain and sore throat.    Eyes:  Negative for pain and visual disturbance.   Respiratory:  Negative for cough and shortness of breath.    Cardiovascular:  Negative for chest pain, palpitations and leg swelling.   Gastrointestinal:  Negative for abdominal distention, abdominal pain and vomiting.   Genitourinary:  Negative for dysuria and hematuria.   Musculoskeletal:  Negative for arthralgias and back pain.   Skin:  Negative for color change and rash.   Neurological:  Negative for dizziness, seizures, syncope and  light-headedness.   All other systems reviewed and are negative.       No Known Allergies  .    Current Outpatient Medications:     Entresto  MG TABS, TAKE 1 TABLET BY MOUTH TWICE A DAY, Disp: 60 tablet, Rfl: 2    eplerenone (INSPRA) 25 mg tablet, Take 1 tablet (25 mg total) by mouth 2 (two) times a day, Disp: 180 tablet, Rfl: 2    furosemide (LASIX) 20 mg tablet, TAKE 1 TABLET BY MOUTH AS NEEDED, Disp: 90 tablet, Rfl: 1    Jardiance 10 MG TABS tablet, TAKE 1 TABLET (10 MG TOTAL) BY MOUTH EVERY MORNING., Disp: 30 tablet, Rfl: 5    metoprolol succinate (TOPROL-XL) 50 mg 24 hr tablet, TAKE 1 TABLET BY MOUTH TWICE A DAY, Disp: 180 tablet, Rfl: 1    rosuvastatin (CRESTOR) 20 MG tablet, Take 1 tablet by mouth in the morning, Disp: , Rfl:     Social History     Socioeconomic History    Marital status: /Civil Union     Spouse name: Not on file    Number of children: Not on file    Years of education: Not on file    Highest education level: Not on file   Occupational History    Not on file   Tobacco Use    Smoking status: Former    Smokeless tobacco: Never   Substance and Sexual Activity    Alcohol use: Not Currently    Drug use: Never    Sexual activity: Not on file   Other Topics Concern    Not on file   Social History Narrative    Not on file     Social Drivers of Health     Financial Resource Strain: Not on file   Food Insecurity: Not on file   Transportation Needs: Not on file   Physical Activity: Not on file   Stress: Not on file (11/4/2024)   Social Connections: Not on file   Intimate Partner Violence: Low Risk  (4/13/2021)    [facility], [facility]    Intimate Partner Violence     Insults You: Not on file     Threatens You: Not on file     Screams at You: Not on file     Physically Hurt: Not on file     Intimate Partner Violence Score: Not on file   Housing Stability: Not on file       No family history on file.    Physical Exam:    Vitals: Blood pressure 136/80, pulse 82, height  "6' 2\" (1.88 m), weight 122 kg (269 lb 8 oz), SpO2 99%., Body mass index is 34.6 kg/m².,   Wt Readings from Last 3 Encounters:   04/01/25 122 kg (269 lb 8 oz)   09/23/24 120 kg (265 lb 9.6 oz)   03/05/24 118 kg (260 lb)       Physical Exam  Constitutional:       General: He is not in acute distress.     Appearance: Normal appearance.   HENT:      Head: Normocephalic and atraumatic.      Mouth/Throat:      Mouth: Mucous membranes are moist.   Eyes:      General: No scleral icterus.     Extraocular Movements: Extraocular movements intact.   Cardiovascular:      Rate and Rhythm: Normal rate and regular rhythm.      Pulses: Normal pulses.      Heart sounds: S1 normal and S2 normal. No murmur heard.     No friction rub. No gallop.      Comments: Nonelevated JVP  Pulmonary:      Breath sounds: Normal breath sounds.   Abdominal:      General: There is no distension.      Palpations: Abdomen is soft.      Tenderness: There is no abdominal tenderness. There is no guarding or rebound.   Musculoskeletal:         General: Normal range of motion.      Cervical back: Neck supple.      Right lower leg: No edema.      Left lower leg: No edema.   Skin:     General: Skin is warm and dry.      Capillary Refill: Capillary refill takes less than 2 seconds.   Neurological:      General: No focal deficit present.      Mental Status: He is alert and oriented to person, place, and time.   Psychiatric:         Mood and Affect: Mood normal.         Labs & Results:    Lab Results   Component Value Date    WBC 8.08 11/25/2022    HGB 16.9 11/25/2022    HCT 52.5 (H) 11/25/2022    MCV 87 11/25/2022     11/25/2022     Lab Results   Component Value Date    SODIUM 140 03/26/2023    K 3.7 03/26/2023     03/26/2023    CO2 25 03/26/2023    BUN 15 03/26/2023    CREATININE 1.07 03/26/2023    GLUC 86 02/14/2023    CALCIUM 9.3 03/26/2023     No results found for: \"NTBNP\"   No results found for: \"CHOLESTEROL\"  No results found for: \"HDL\"  No " "results found for: \"TRIG\"  No results found for: \"NONHDLC\"    EKG personally reviewed by Keisha Dolan MD.       Thank you for the opportunity to participate in the care of this patient.    KEISHA DOLAN MD  ADVANCED HEART FAILURE AND MECHANICAL CIRCULATORY SUPPORT  [facility]    "

## 2025-04-01 ENCOUNTER — OFFICE VISIT (OUTPATIENT)
Dept: CARDIOLOGY CLINIC | Facility: CLINIC | Age: 45
End: 2025-04-01
Payer: COMMERCIAL

## 2025-04-01 VITALS
DIASTOLIC BLOOD PRESSURE: 80 MMHG | HEART RATE: 82 BPM | BODY MASS INDEX: 34.59 KG/M2 | SYSTOLIC BLOOD PRESSURE: 136 MMHG | OXYGEN SATURATION: 99 % | WEIGHT: 269.5 LBS | HEIGHT: 74 IN

## 2025-04-01 DIAGNOSIS — I10 PRIMARY HYPERTENSION: ICD-10-CM

## 2025-04-01 DIAGNOSIS — E78.2 MIXED HYPERLIPIDEMIA: ICD-10-CM

## 2025-04-01 DIAGNOSIS — I50.20 HEART FAILURE WITH REDUCED EJECTION FRACTION (HCC): Primary | ICD-10-CM

## 2025-04-01 DIAGNOSIS — G47.33 OSA (OBSTRUCTIVE SLEEP APNEA): ICD-10-CM

## 2025-04-01 PROCEDURE — 99214 OFFICE O/P EST MOD 30 MIN: CPT | Performed by: INTERNAL MEDICINE

## 2025-04-01 RX ORDER — ROSUVASTATIN CALCIUM 20 MG/1
1 TABLET, COATED ORAL DAILY
COMMUNITY
Start: 2025-03-06

## 2025-04-01 RX ORDER — EPLERENONE 25 MG/1
25 TABLET ORAL 2 TIMES DAILY
Qty: 180 TABLET | Refills: 2 | Status: SHIPPED | OUTPATIENT
Start: 2025-04-01

## 2025-04-01 NOTE — PATIENT INSTRUCTIONS
Increase eplerenone to 25mg two times a day  Obtain BMP as ordered  Continue rest of cardiac medications  2g sodium diet  2L fluid restriction  Daily weights  Physical activities/exercise as tolerated

## 2025-04-23 DIAGNOSIS — I50.20 HEART FAILURE WITH REDUCED EJECTION FRACTION (HCC): ICD-10-CM

## 2025-04-23 RX ORDER — EPLERENONE 25 MG/1
25 TABLET ORAL 2 TIMES DAILY
Qty: 180 TABLET | Refills: 0 | OUTPATIENT
Start: 2025-04-23

## 2025-04-28 ENCOUNTER — APPOINTMENT (OUTPATIENT)
Dept: LAB | Facility: CLINIC | Age: 45
End: 2025-04-28
Attending: INTERNAL MEDICINE
Payer: COMMERCIAL

## 2025-04-28 DIAGNOSIS — I50.32 HEART FAILURE WITH IMPROVED EJECTION FRACTION (HFIMPEF) (HCC): ICD-10-CM

## 2025-04-28 DIAGNOSIS — I50.20 HEART FAILURE WITH REDUCED EJECTION FRACTION (HCC): ICD-10-CM

## 2025-04-28 LAB
ANION GAP SERPL CALCULATED.3IONS-SCNC: 7 MMOL/L (ref 4–13)
BUN SERPL-MCNC: 14 MG/DL (ref 5–25)
CALCIUM SERPL-MCNC: 9.4 MG/DL (ref 8.4–10.2)
CHLORIDE SERPL-SCNC: 107 MMOL/L (ref 96–108)
CO2 SERPL-SCNC: 27 MMOL/L (ref 21–32)
CREAT SERPL-MCNC: 0.96 MG/DL (ref 0.6–1.3)
GFR SERPL CREATININE-BSD FRML MDRD: 94 ML/MIN/1.73SQ M
GLUCOSE P FAST SERPL-MCNC: 110 MG/DL (ref 65–99)
POTASSIUM SERPL-SCNC: 3.5 MMOL/L (ref 3.5–5.3)
SODIUM SERPL-SCNC: 141 MMOL/L (ref 135–147)

## 2025-04-28 PROCEDURE — 80048 BASIC METABOLIC PNL TOTAL CA: CPT

## 2025-04-28 PROCEDURE — 36415 COLL VENOUS BLD VENIPUNCTURE: CPT

## 2025-04-30 ENCOUNTER — RESULTS FOLLOW-UP (OUTPATIENT)
Dept: CARDIOLOGY CLINIC | Facility: CLINIC | Age: 45
End: 2025-04-30

## 2025-05-05 DIAGNOSIS — I10 PRIMARY HYPERTENSION: ICD-10-CM

## 2025-05-05 DIAGNOSIS — I50.20 HEART FAILURE WITH REDUCED EJECTION FRACTION (HCC): ICD-10-CM

## 2025-05-07 RX ORDER — SACUBITRIL AND VALSARTAN 97; 103 MG/1; MG/1
1 TABLET, FILM COATED ORAL 2 TIMES DAILY
Qty: 60 TABLET | Refills: 5 | Status: SHIPPED | OUTPATIENT
Start: 2025-05-07

## 2025-05-13 DIAGNOSIS — I50.20 HEART FAILURE WITH REDUCED EJECTION FRACTION (HCC): ICD-10-CM

## 2025-05-13 RX ORDER — EPLERENONE 25 MG/1
25 TABLET ORAL 2 TIMES DAILY
Qty: 180 TABLET | Refills: 1 | Status: SHIPPED | OUTPATIENT
Start: 2025-05-13

## 2025-05-19 ENCOUNTER — OFFICE VISIT (OUTPATIENT)
Age: 45
End: 2025-05-19
Payer: COMMERCIAL

## 2025-05-19 VITALS
DIASTOLIC BLOOD PRESSURE: 82 MMHG | BODY MASS INDEX: 34.22 KG/M2 | WEIGHT: 266.6 LBS | HEIGHT: 74 IN | SYSTOLIC BLOOD PRESSURE: 134 MMHG

## 2025-05-19 DIAGNOSIS — E66.09 CLASS 1 OBESITY DUE TO EXCESS CALORIES WITHOUT SERIOUS COMORBIDITY WITH BODY MASS INDEX (BMI) OF 34.0 TO 34.9 IN ADULT: ICD-10-CM

## 2025-05-19 DIAGNOSIS — E66.811 CLASS 1 OBESITY DUE TO EXCESS CALORIES WITHOUT SERIOUS COMORBIDITY WITH BODY MASS INDEX (BMI) OF 34.0 TO 34.9 IN ADULT: ICD-10-CM

## 2025-05-19 DIAGNOSIS — G47.33 OSA (OBSTRUCTIVE SLEEP APNEA): Primary | ICD-10-CM

## 2025-05-19 PROCEDURE — 99214 OFFICE O/P EST MOD 30 MIN: CPT | Performed by: INTERNAL MEDICINE

## 2025-05-19 NOTE — ASSESSMENT & PLAN NOTE
Severe obstructive sleep apnea  Diagnostic PSG AHI 66.5, central apnea index 3.7, Cheyne-Moore respiration noted  CPAP study 4/1/2023 showed optimal pressure BiPAP 19/14 with no evidence of central apneas or Cheyne-Moore respiration    Compliance from 4/14 - 5/13  More than 4 hours 57%, 5 hours and 48 minutes  On auto BiPAP with minimum EPAP 14, pressure support 5, max IPAP 25  Median leak 0.5  Residual AHI 4.8  95 percentile pressure 22/17    He notes improvement in all symptoms  States he is doing well    Plan  Ordered CPAP supplies  Follow-up in 1 year    Orders:    PAP DME Resupply/Reorder

## 2025-05-19 NOTE — PROGRESS NOTES
Name: Tobias Avila      : 1980      MRN: [id number]  Encounter Provider: Edmar Shepard MD  Encounter Date: 2025   Encounter department: [facility] SLEEP [facility]IC[facility] [place]    :  Assessment & Plan  NIGHAT (obstructive sleep apnea)  Severe obstructive sleep apnea  Diagnostic PSG AHI 66.5, central apnea index 3.7, Cheyne-Moore respiration noted  CPAP study 2023 showed optimal pressure BiPAP 19/14 with no evidence of central apneas or Cheyne-Moore respiration    Compliance from  -   More than 4 hours 57%, 5 hours and 48 minutes  On auto BiPAP with minimum EPAP 14, pressure support 5, max IPAP 25  Median leak 0.5  Residual AHI 4.8  95 percentile pressure 22/17    He notes improvement in all symptoms  States he is doing well    Plan  Ordered CPAP supplies  Follow-up in 1 year    Orders:    PAP DME Resupply/Reorder    Class 1 obesity due to excess calories without serious comorbidity with body mass index (BMI) of 34.0 to 34.9 in adult  Counseled patient on lifestyle modifications including diet and exercise  Explained that weight loss will decrease the severity of sleep apnea  Discussed Zepbound with the patient but he declined       Heart failure with reduced ejection fraction  Counseled patient on the importance of using BiPAP for 7 hours at night as adequate treatment of sleep apnea can improve ejection fraction    History of Present Illness     Tobias Avila Pleasant 45-year-old male with past medical history of heart failure with reduced ejection fraction, hyperlipidemia, severe obstructive sleep apnea.  Initial sleep study showed an AHI of six 6.5.  CPAP study showed optimal pressure at BiPAP 19/14.   He does note vast improvement in symptoms when he does use his BiPAP.  His current [place] score is 2.  He is no longer snoring.  He states he feels more refreshed when he uses it.  He presents today with his partner.  He is getting 6 hours of sleep nightly and sometimes 7 hours.  He has no  complaints.                  Sitting and reading: Would never doze  Watching TV: Moderate chance of dozing  Sitting, inactive in a public place (e.g. a theatre or a meeting): Would never doze  As a passenger in a car for an hour without a break: Would never doze  Lying down to rest in the afternoon when circumstances permit: Would never doze  Sitting and talking to someone: Would never doze  Sitting quietly after a lunch without alcohol: Would never doze  In a car, while stopped for a few minutes in traffic: Would never doze  Total score: 2       Review of Systems   Constitutional: Negative.    HENT: Negative.     Eyes: Negative.    Respiratory: Negative.     Cardiovascular: Negative.    Gastrointestinal: Negative.    Endocrine: Negative.    Genitourinary: Negative.    Musculoskeletal: Negative.    Skin: Negative.    Allergic/Immunologic: Negative.    Neurological: Negative.    Hematological: Negative.    Psychiatric/Behavioral: Negative.       Pertinent positives/negatives included in HPI and also as noted:       Pertinent Medical History           Medical History Reviewed by provider this encounter:     .  Past Medical History   No past medical history on file.  Past Surgical History:   Procedure Laterality Date    CARDIAC CATHETERIZATION Left 12/6/2022    Procedure: CARDIAC LEFT HEART CATH;  Surgeon: Edgar Avila MD;  Location: [facility];  Service: Cardiology    CARDIAC CATHETERIZATION N/A 12/6/2022    Procedure: Cardiac Coronary Angiogram;  Surgeon: Edgar Avila MD;  Location: [facility];  Service: Cardiology     No family history on file.   reports that he has quit smoking. He has never used smokeless tobacco. He reports that he does not currently use alcohol. He reports that he does not use drugs.  Current Outpatient Medications   Medication Instructions    eplerenone (INSPRA) 25 mg, Oral, 2 times daily    furosemide (LASIX) 20 mg tablet TAKE 1 TABLET BY MOUTH AS NEEDED    Jardiance 10 mg,  "Oral, Every morning    metoprolol succinate (TOPROL-XL) 50 mg, Oral, 2 times daily    rosuvastatin (CRESTOR) 20 MG tablet 1 tablet, Daily    sacubitril-valsartan (Entresto)  MG TABS 1 tablet, Oral, 2 times daily   Allergies[1]   Medications Ordered Prior to Encounter[2]   Social History     Tobacco Use    Smoking status: Former    Smokeless tobacco: Never   Substance and Sexual Activity    Alcohol use: Not Currently    Drug use: Never    Sexual activity: Not on file     Objective   /82 (BP Location: Right arm, Patient Position: Sitting, Cuff Size: Large)   Ht 6' 2\" (1.88 m)   Wt 121 kg (266 lb 9.6 oz)   BMI 34.23 kg/m²        Physical Exam  Vitals reviewed.   HENT:      Head: Normocephalic and atraumatic.      Nose: Nose normal.      Mouth/Throat:      Mouth: Mucous membranes are moist.     Eyes:      Extraocular Movements: Extraocular movements intact.      Pupils: Pupils are equal, round, and reactive to light.       Cardiovascular:      Rate and Rhythm: Normal rate and regular rhythm.      Pulses: Normal pulses.   Pulmonary:      Effort: Pulmonary effort is normal.      Breath sounds: Normal breath sounds.   Abdominal:      General: Abdomen is flat. Bowel sounds are normal.      Palpations: Abdomen is soft.     Musculoskeletal:         General: Normal range of motion.      Cervical back: Normal range of motion.     Skin:     General: Skin is warm.     Neurological:      Mental Status: He is alert. Mental status is at baseline.     Psychiatric:         Mood and Affect: Mood normal.       Visit Vitals  /82 (BP Location: Right arm, Patient Position: Sitting, Cuff Size: Large)   Ht 6' 2\" (1.88 m)   Wt 121 kg (266 lb 9.6 oz)   BMI 34.23 kg/m²   Smoking Status Former   BSA 2.46 m²             Data  Lab Results   Component Value Date    HGB 16.9 11/25/2022    HCT 52.5 (H) 11/25/2022    MCV 87 11/25/2022      Lab Results   Component Value Date    CALCIUM 9.4 04/28/2025    K 3.5 04/28/2025    CO2 27 " 04/28/2025     04/28/2025    BUN 14 04/28/2025    CREATININE 0.96 04/28/2025     Lab Results   Component Value Date    IRON 96 11/25/2022    TIBC 359 11/25/2022    FERRITIN 147 11/25/2022     Lab Results   Component Value Date    AST 29 11/25/2022    ALT 44 11/25/2022                  [1] No Known Allergies  [2]   Current Outpatient Medications on File Prior to Visit   Medication Sig Dispense Refill    eplerenone (INSPRA) 25 mg tablet Take 1 tablet (25 mg total) by mouth 2 (two) times a day 180 tablet 1    furosemide (LASIX) 20 mg tablet TAKE 1 TABLET BY MOUTH AS NEEDED 90 tablet 1    Jardiance 10 MG TABS tablet TAKE 1 TABLET (10 MG TOTAL) BY MOUTH EVERY MORNING. 30 tablet 5    metoprolol succinate (TOPROL-XL) 50 mg 24 hr tablet TAKE 1 TABLET BY MOUTH TWICE A  tablet 1    rosuvastatin (CRESTOR) 20 MG tablet Take 1 tablet by mouth in the morning      sacubitril-valsartan (Entresto)  MG TABS TAKE 1 TABLET BY MOUTH TWICE A DAY 60 tablet 5     No current facility-administered medications on file prior to visit.

## 2025-05-20 ENCOUNTER — TELEPHONE (OUTPATIENT)
Dept: SLEEP CENTER | Facility: CLINIC | Age: 45
End: 2025-05-20

## 2025-05-22 LAB
DME PARACHUTE DELIVERY DATE REQUESTED: NORMAL
DME PARACHUTE ITEM DESCRIPTION: NORMAL
DME PARACHUTE ORDER STATUS: NORMAL
DME PARACHUTE SUPPLIER NAME: NORMAL
DME PARACHUTE SUPPLIER PHONE: NORMAL

## 2025-07-24 ENCOUNTER — TELEPHONE (OUTPATIENT)
Age: 45
End: 2025-07-24

## 2025-07-29 DIAGNOSIS — I50.20 HEART FAILURE WITH REDUCED EJECTION FRACTION (HCC): ICD-10-CM

## 2025-07-30 RX ORDER — EMPAGLIFLOZIN 10 MG/1
10 TABLET, FILM COATED ORAL EVERY MORNING
Qty: 30 TABLET | Refills: 5 | Status: SHIPPED | OUTPATIENT
Start: 2025-07-30

## (undated) DEVICE — TR BAND RADIAL ARTERY COMPRESSION DEVICE: Brand: TR BAND

## (undated) DEVICE — GLIDESHEATH SLENDER STAINLESS STEEL KIT: Brand: GLIDESHEATH SLENDER

## (undated) DEVICE — GUIDEWIRE WHOLEY HI TORQUE INTERM MOD J .035 145CM

## (undated) DEVICE — RADIFOCUS OPTITORQUE ANGIOGRAPHIC CATHETER: Brand: OPTITORQUE

## (undated) DEVICE — DGW .035 FC J3MM 260CM TEF: Brand: EMERALD

## (undated) DEVICE — CATH GUIDE LAUNCHER 5FR EBU 3.75